# Patient Record
Sex: MALE | Race: WHITE | Employment: FULL TIME | URBAN - METROPOLITAN AREA
[De-identification: names, ages, dates, MRNs, and addresses within clinical notes are randomized per-mention and may not be internally consistent; named-entity substitution may affect disease eponyms.]

---

## 2017-02-07 ENCOUNTER — OFFICE VISIT (OUTPATIENT)
Dept: FAMILY MEDICINE CLINIC | Age: 57
End: 2017-02-07

## 2017-02-07 VITALS
WEIGHT: 188.4 LBS | HEART RATE: 85 BPM | RESPIRATION RATE: 16 BRPM | TEMPERATURE: 97.9 F | SYSTOLIC BLOOD PRESSURE: 149 MMHG | HEIGHT: 74 IN | BODY MASS INDEX: 24.18 KG/M2 | DIASTOLIC BLOOD PRESSURE: 95 MMHG | OXYGEN SATURATION: 98 %

## 2017-02-07 DIAGNOSIS — M54.42 ACUTE BILATERAL LOW BACK PAIN WITH BILATERAL SCIATICA: Primary | ICD-10-CM

## 2017-02-07 DIAGNOSIS — F10.10 ETOH ABUSE: ICD-10-CM

## 2017-02-07 DIAGNOSIS — R97.20 ELEVATED PSA: ICD-10-CM

## 2017-02-07 DIAGNOSIS — Z12.11 COLON CANCER SCREENING: ICD-10-CM

## 2017-02-07 DIAGNOSIS — E78.2 MIXED HYPERLIPIDEMIA: ICD-10-CM

## 2017-02-07 DIAGNOSIS — I10 ESSENTIAL HYPERTENSION: ICD-10-CM

## 2017-02-07 DIAGNOSIS — F41.9 ANXIETY: ICD-10-CM

## 2017-02-07 DIAGNOSIS — M54.41 ACUTE BILATERAL LOW BACK PAIN WITH BILATERAL SCIATICA: Primary | ICD-10-CM

## 2017-02-07 LAB
A/G RATIO: 1.4 (ref 1.1–2.2)
ALBUMIN SERPL-MCNC: 4.4 G/DL (ref 3.4–5)
ALP BLD-CCNC: 96 U/L (ref 40–129)
ALT SERPL-CCNC: 39 U/L (ref 10–40)
ANION GAP SERPL CALCULATED.3IONS-SCNC: 18 MMOL/L (ref 3–16)
AST SERPL-CCNC: 53 U/L (ref 15–37)
BILIRUB SERPL-MCNC: 0.6 MG/DL (ref 0–1)
BUN BLDV-MCNC: 7 MG/DL (ref 7–20)
CALCIUM SERPL-MCNC: 9.6 MG/DL (ref 8.3–10.6)
CHLORIDE BLD-SCNC: 94 MMOL/L (ref 99–110)
CHOLESTEROL, TOTAL: 242 MG/DL (ref 0–199)
CO2: 26 MMOL/L (ref 21–32)
CREAT SERPL-MCNC: 0.8 MG/DL (ref 0.9–1.3)
GFR AFRICAN AMERICAN: >60
GFR NON-AFRICAN AMERICAN: >60
GLOBULIN: 3.2 G/DL
GLUCOSE BLD-MCNC: 99 MG/DL (ref 70–99)
HDLC SERPL-MCNC: 102 MG/DL (ref 40–60)
LDL CHOLESTEROL CALCULATED: 125 MG/DL
POTASSIUM SERPL-SCNC: 4.8 MMOL/L (ref 3.5–5.1)
PROSTATE SPECIFIC ANTIGEN: 6.39 NG/ML (ref 0–4)
SODIUM BLD-SCNC: 138 MMOL/L (ref 136–145)
TOTAL PROTEIN: 7.6 G/DL (ref 6.4–8.2)
TRIGL SERPL-MCNC: 77 MG/DL (ref 0–150)
VLDLC SERPL CALC-MCNC: 15 MG/DL

## 2017-02-07 PROCEDURE — 99214 OFFICE O/P EST MOD 30 MIN: CPT | Performed by: FAMILY MEDICINE

## 2017-02-07 RX ORDER — METHYLPREDNISOLONE 4 MG/1
TABLET ORAL
Qty: 1 KIT | Refills: 0 | Status: SHIPPED | OUTPATIENT
Start: 2017-02-07

## 2017-02-07 RX ORDER — NAPROXEN 500 MG/1
500 TABLET ORAL 2 TIMES DAILY WITH MEALS
Qty: 30 TABLET | Refills: 0 | Status: SHIPPED | OUTPATIENT
Start: 2017-02-07

## 2017-02-07 RX ORDER — HYDROCHLOROTHIAZIDE 12.5 MG/1
12.5 TABLET ORAL DAILY
Qty: 30 TABLET | Refills: 0 | Status: SHIPPED | OUTPATIENT
Start: 2017-02-07

## 2017-02-07 ASSESSMENT — ENCOUNTER SYMPTOMS
APNEA: 0
CHEST TIGHTNESS: 0
RECTAL PAIN: 0
ABDOMINAL PAIN: 0
ABDOMINAL DISTENTION: 0
VOMITING: 0
DIARRHEA: 0
STRIDOR: 0
SHORTNESS OF BREATH: 0
CHOKING: 0
BACK PAIN: 1
NAUSEA: 0
WHEEZING: 0
ANAL BLEEDING: 0
COUGH: 0
BLOOD IN STOOL: 0
CONSTIPATION: 0

## 2017-02-08 ENCOUNTER — TELEPHONE (OUTPATIENT)
Dept: FAMILY MEDICINE CLINIC | Age: 57
End: 2017-02-08

## 2017-02-08 DIAGNOSIS — R97.20 ELEVATED PSA: Primary | ICD-10-CM

## 2020-02-18 ENCOUNTER — HOSPITAL ENCOUNTER (EMERGENCY)
Age: 60
Discharge: HOME | End: 2020-02-18
Attending: EMERGENCY MEDICINE
Payer: MEDICAID

## 2020-02-18 VITALS
DIASTOLIC BLOOD PRESSURE: 75 MMHG | HEART RATE: 80 BPM | SYSTOLIC BLOOD PRESSURE: 150 MMHG | HEIGHT: 75 IN | RESPIRATION RATE: 16 BRPM | BODY MASS INDEX: 21.14 KG/M2 | OXYGEN SATURATION: 100 % | TEMPERATURE: 98.6 F | WEIGHT: 170 LBS

## 2020-02-18 LAB
ANION GAP SERPL CALCULATED.3IONS-SCNC: 11 MMOL/L (ref 3–16)
BASOPHILS ABSOLUTE: 0 K/UL (ref 0–0.2)
BASOPHILS RELATIVE PERCENT: 0.6 %
BILIRUBIN URINE: NEGATIVE
BLOOD, URINE: NEGATIVE
BUN BLDV-MCNC: 11 MG/DL (ref 7–20)
CALCIUM SERPL-MCNC: 8.8 MG/DL (ref 8.3–10.6)
CHLORIDE BLD-SCNC: 100 MMOL/L (ref 99–110)
CLARITY: CLEAR
CO2: 21 MMOL/L (ref 21–32)
COLOR: YELLOW
CREAT SERPL-MCNC: 0.7 MG/DL (ref 0.9–1.3)
EOSINOPHILS ABSOLUTE: 0 K/UL (ref 0–0.6)
EOSINOPHILS RELATIVE PERCENT: 0.4 %
ETHANOL: NORMAL MG/DL (ref 0–0.08)
GFR AFRICAN AMERICAN: >60
GFR NON-AFRICAN AMERICAN: >60
GLUCOSE BLD-MCNC: 95 MG/DL (ref 70–99)
GLUCOSE URINE: NEGATIVE MG/DL
HCT VFR BLD CALC: 30.8 % (ref 40.5–52.5)
HEMOGLOBIN: 10.4 G/DL (ref 13.5–17.5)
KETONES, URINE: NEGATIVE MG/DL
LEUKOCYTE ESTERASE, URINE: NEGATIVE
LYMPHOCYTES ABSOLUTE: 1.5 K/UL (ref 1–5.1)
LYMPHOCYTES RELATIVE PERCENT: 25.2 %
MCH RBC QN AUTO: 34 PG (ref 26–34)
MCHC RBC AUTO-ENTMCNC: 33.7 G/DL (ref 31–36)
MCV RBC AUTO: 100.9 FL (ref 80–100)
MICROSCOPIC EXAMINATION: NORMAL
MONOCYTES ABSOLUTE: 0.5 K/UL (ref 0–1.3)
MONOCYTES RELATIVE PERCENT: 8.8 %
NEUTROPHILS ABSOLUTE: 3.9 K/UL (ref 1.7–7.7)
NEUTROPHILS RELATIVE PERCENT: 65 %
NITRITE, URINE: NEGATIVE
PDW BLD-RTO: 13.2 % (ref 12.4–15.4)
PH UA: 5.5 (ref 5–8)
PLATELET # BLD: 252 K/UL (ref 135–450)
PMV BLD AUTO: 6.5 FL (ref 5–10.5)
POTASSIUM REFLEX MAGNESIUM: 4 MMOL/L (ref 3.5–5.1)
PROTEIN UA: NEGATIVE MG/DL
RBC # BLD: 3.05 M/UL (ref 4.2–5.9)
SODIUM BLD-SCNC: 132 MMOL/L (ref 136–145)
SPECIFIC GRAVITY UA: 1.02 (ref 1–1.03)
URINE REFLEX TO CULTURE: NORMAL
URINE TYPE: NORMAL
UROBILINOGEN, URINE: 1 E.U./DL
WBC # BLD: 6 K/UL (ref 4–11)

## 2020-02-18 PROCEDURE — 81003 URINALYSIS AUTO W/O SCOPE: CPT

## 2020-02-18 PROCEDURE — 99284 EMERGENCY DEPT VISIT MOD MDM: CPT

## 2020-02-18 PROCEDURE — 85025 COMPLETE CBC W/AUTO DIFF WBC: CPT

## 2020-02-18 PROCEDURE — G0480 DRUG TEST DEF 1-7 CLASSES: HCPCS

## 2020-02-18 PROCEDURE — 80048 BASIC METABOLIC PNL TOTAL CA: CPT

## 2020-02-18 ASSESSMENT — ENCOUNTER SYMPTOMS
ABDOMINAL PAIN: 0
RHINORRHEA: 0
SHORTNESS OF BREATH: 0
NAUSEA: 0
DIARRHEA: 0
SORE THROAT: 0
BLOOD IN STOOL: 0
VOMITING: 0
CONSTIPATION: 0

## 2020-02-18 NOTE — CARE COORDINATION
SW completed PASRR form, faxed all discharge paperwork with facesheet and doctor's note to [facility]. Left a message for Gaudencio Hernandez to confirm it was received. MARLY met with family again informing that all forms and information has been faxed. MARLY stated just waiting for a call back from Gaudencio Hernandez for official confirmation pt has been accepted there. Family asked if they could leave now to take pt to the facility. MARLY stated yes they could, but it's not guaranteed at this point that pt has been accepted. MARLY stated that the Director at the facility has the final decision of this. Family verbalized understanding. Stated they would like to leave anyway as they have some things to  before they get there and have been waiting in ED a long time. MARLY confirmed on fax machine that fax successfully was sent. MARLY informed RN that family wants to discharge and take pt to [facility] now.       Electronically signed by MED Griffin LSW on 2/18/2020 at 4:00 PM

## 2020-02-18 NOTE — CARE COORDINATION
MARLY consulted to assist w/placement for pt to [facility]. Pt seen and diagnosed with dementia onset from long term alcohol use. SW contacted Jyoti Ordonez at the facility, who stated that their normal admissions person, Remington Diaz, is out today. Julián Monroy stated she was informed of this possible admission. Julián Monroy stated she needed PASRR completed and faxed along with doctor note and discharge paperwork for patient from the ED. Julián Monroy stated she is communicating with her Director and will get to MARLY with an answer if pt can come over today for admission to the facility.     Electronically signed by MED Adams, MARIA ANTONIA on 2/18/2020 at 3:07 PM

## 2020-02-18 NOTE — DISCHARGE INSTR - COC
Continuity of Care Form    Patient Name: Cruz Sanchez   :  1960  MRN:  [id number]    Admit date:  2020  Discharge date:  ***    Code Status Order: No Order   Advance Directives:     Admitting Physician:  No admitting provider for patient encounter. PCP: Dolores Rust MD    Discharging Nurse: [facility] Unit/Room#: ED-0005/05  Discharging Unit Phone Number: ***    Emergency Contact:   Extended Emergency Contact Information  Primary Emergency Contact: [address] [facility] Phone: [phone]  Relation: Brother/Sister  Secondary Emergency Contact: Anam Jackson, [address] Phone: [phone]  Relation: Child    Past Surgical History:  Past Surgical History:   Procedure Laterality Date    KNEE SURGERY Left 1976    Fracture:ORIF       Immunization History: There is no immunization history on file for this patient.     Active Problems:  Patient Active Problem List   Diagnosis Code    Neck pain on left side M54.2    Left-sided low back pain   M54.5    Anxiety F41.9    Elevated blood pressure reading without diagnosis of hypertension R03.0    ETOH abuse F10.10    Hyperlipidemia E78.5    Elevated PSA R97.20    Elevated liver function tests R94.5       Isolation/Infection:   Isolation          No Isolation        Patient Infection Status     None to display          Nurse Assessment:  Last Vital Signs: BP (!) 156/74   Pulse 84   Temp 98.6 °F (37 °C) (Infrared)   Resp 16   Ht 6' 3\" (1.905 m)   Wt 170 lb (77.1 kg)   SpO2 100%   BMI 21.25 kg/m²     Last documented pain score (0-10 scale):    Last Weight:   Wt Readings from Last 1 Encounters:   20 170 lb (77.1 kg)     Mental Status:  {IP PT MENTAL STATUS:}    IV Access:  { BUCK IV ACCESS:767750519}    Nursing Mobility/ADLs:  Walking   {CHP DME EIRD:836897024}  Transfer  {CHP DME HFKO:843466607}  Bathing  {CHP DME UOIC:607067860}  Dressing  {CHP DME NOCH:500838291}  Toileting  {CHP DME OJJ}  Feeding  {CHP DME LQKI:098249784}  Med Admin  {[facility] DME CRRN:523725610}  Med Delivery   { BUCK MED Delivery:967045303}    Wound Care Documentation and Therapy:        Elimination:  Continence:   · Bowel: {YES / RODRÍGUEZ:66234}  · Bladder: {YES / LX:75613}  Urinary Catheter: {Urinary Catheter:761817840}   Colostomy/Ileostomy/Ileal Conduit: {YES / VZ:40614}       Date of Last BM: ***  No intake or output data in the 24 hours ending 20 1454  No intake/output data recorded.     Safety Concerns:     [address] Safety Concerns:050885921}    Impairments/Disabilities:      [address] Impairments/Disabilities:430592890}    Nutrition Therapy:  Current Nutrition Therapy:   [address] Diet List:034473318}    Routes of Feeding: {CHP DME Other Feedings:341021770}  Liquids: {Slp liquid thickness:36337}  Daily Fluid Restriction: {CHP DME Yes amt example:930074359}  Last Modified Barium Swallow with Video (Video Swallowing Test): {Done Not Done [facility]:701067071}    Treatments at the Time of Hospital Discharge:   Respiratory Treatments: ***  Oxygen Therapy:  {Therapy; copd oxygen:55028}  Ventilator:    { CC Vent YZMI:170183671}    Rehab Therapies: {THERAPEUTIC INTERVENTION:0738577939}  Weight Bearing Status/Restrictions: [address] [facility] Weight Bearin}  Other Medical Equipment (for information only, NOT a DME order):  {EQUIPMENT:799648371}  Other Treatments: ***    Patient's personal belongings (please select all that are sent with patient):  {[facility] DME Belongings:129804510}    RN SIGNATURE:  {Esignature:841154343}    CASE MANAGEMENT/SOCIAL WORK SECTION    Inpatient Status Date: ***    Readmission Risk Assessment Score:  Readmission Risk              Risk of Unplanned Readmission:        0           Discharging to Facility/ Agency   · Name:   · Address:  · Phone:  · Fax:    Dialysis Facility (if applicable)   · Name:  · Address:  · Dialysis Schedule:  · Phone:  · Fax:    / signature: {Esignature:150749985}    PHYSICIAN SECTION    Prognosis: {Prognosis:3598668741}    Condition at Discharge: Jose E Abbasi Patient Condition:749849069}    Rehab Potential (if transferring to Rehab): {Prognosis:0769261151}    Recommended Labs or Other Treatments After Discharge: ***    Physician Certification: I certify the above information and transfer of Damian Cordon  is necessary for the continuing treatment of the diagnosis listed and that he requires {Admit to Appropriate Level of Care:21514} for {GREATER/LESS:061524606} 30 days.      Update Admission H&P: {CHP DME Changes in DXUZP:939270540}    PHYSICIAN SIGNATURE:  {Esignature:550455082}

## 2020-02-18 NOTE — ED PROVIDER NOTES
[facility]        Pt Name: Denis Pulido  MRN: [id number]  [place] 1960  Date of evaluation: 2020  Provider: GALO Lawler CNP  PCP: Stephanie Asher MD    This patient was seen and evaluated by the attending physician Janiya Davila MD.    CHIEF COMPLAINT       Chief Complaint   Patient presents with    Other     Pt has alcohol related dementia and recently treated and released from  where he went to a rehab facility. Pt left rehab facility because he didn't like it. family states it wasn't secure enough. Family has been attempting to find other placement but needs him medically cleared and a referral for placement. HISTORY OF PRESENT ILLNESS   (Location/Symptom, Timing/Onset,Context/Setting, Quality, Duration, Modifying Factors, Severity)  Note limiting factors. Denis Pulido is a 64 y.o. male who presents emergency department with concern for dementia. Patient was recently admitted to . Identified to have dementia secondary to chronic alcoholism. He was discharged to a long-term facility but signed himself out AMA. He was lost wandering around [facility] after this. Family reports that he is aware that he is a father, however he thinks his children are young when they are actually in their 35s. He is confused about the fact that he is grandchildren. He thinks his parents are still living even though they have been  for many years now. He also thinks he still with his wife even though they have been  for years as well. Family is trying to get power of . They have a facility that is willing to take him on a locked unit but need medical clearance. The patient is not suicidal or homicidal.  He is agreeable to go to the facility.   He denies fever, rash, headaches, dizziness, chest pain, shortness of breath, cough, congestion, abdominal pain, nausea, vomiting, High Cholesterol Neg Hx     Hypertension Neg Hx     Migraines Neg Hx     Rashes/Skin Problems Neg Hx     Seizures Neg Hx     Stroke Neg Hx     Thyroid Disease Neg Hx           SOCIAL HISTORY       Social History     Socioeconomic History    Marital status: Single     Spouse name: None    Number of children: None    Years of education: None    Highest education level: None   Occupational History    Occupation: Unemployed   Social Needs    Financial resource strain: None    Food insecurity:     Worry: None     Inability: None    Transportation needs:     Medical: None     Non-medical: None   Tobacco Use    Smoking status: Current Every Day Smoker     Packs/day: 1.00     Years: 20.00     Pack years: 20.00     Types: Cigarettes    Smokeless tobacco: Never Used   Substance and Sexual Activity    Alcohol use: Yes     Alcohol/week: 63.0 standard drinks     Types: 63 Cans of beer per week     Comment: 8-10 beers everyday (on 2/18/20 pt reports 2 a day, family unaware)    Drug use: No    Sexual activity: Yes   Lifestyle    Physical activity:     Days per week: None     Minutes per session: None    Stress: None   Relationships    Social connections:     Talks on phone: None     Gets together: None     Attends Nondenominational service: None     Active member of club or organization: None     Attends meetings of clubs or organizations: None     Relationship status: None    Intimate partner violence:     Fear of current or ex partner: None     Emotionally abused: None     Physically abused: None     Forced sexual activity: None   Other Topics Concern    None   Social History Narrative    None       SCREENINGS             PHYSICAL EXAM  (up to 7 for level 4, 8 or more for level 5)     ED Triage Vitals [02/18/20 1144]   BP Temp Temp Source Pulse Resp SpO2 Height Weight   121/80 98.6 °F (37 °C) Infrared 84 16 100 % 6' 3\" (1.905 m) 170 lb (77.1 kg)       Physical Exam  Vitals signs and nursing note reviewed. Constitutional:       Appearance: He is well-developed. He is not diaphoretic. HENT:      Head: Normocephalic and atraumatic. Eyes:      General: No scleral icterus. Right eye: No discharge. Left eye: No discharge. Neck:      Musculoskeletal: Normal range of motion and neck supple. Cardiovascular:      Rate and Rhythm: Normal rate and regular rhythm. Heart sounds: Normal heart sounds. No murmur. No friction rub. No gallop. Pulmonary:      Effort: Pulmonary effort is normal. No respiratory distress. Breath sounds: Normal breath sounds. No stridor. No wheezing or rales. Chest:      Chest wall: No tenderness. Abdominal:      General: Bowel sounds are normal. There is no distension. Palpations: Abdomen is soft. There is no mass. Tenderness: There is no abdominal tenderness. There is no guarding or rebound. Musculoskeletal: Normal range of motion. General: No tenderness. Skin:     General: Skin is warm and dry. Coloration: Skin is not pale. Neurological:      Mental Status: He is alert and oriented to person, place, and time. Coordination: Coordination normal.   Psychiatric:         Behavior: Behavior normal.         Thought Content: Thought content does not include homicidal or suicidal ideation.          DIAGNOSTIC RESULTS   LABS:    Labs Reviewed   CBC WITH AUTO DIFFERENTIAL - Abnormal; Notable for the following components:       Result Value    RBC 3.05 (*)     Hemoglobin 10.4 (*)     Hematocrit 30.8 (*)     .9 (*)     All other components within normal limits    Narrative:     Performed at:  [facility] [address], [place], [place] [facility] [address]   Phone [phone]   BASIC METABOLIC PANEL W/ REFLEX TO MG FOR LOW K - Abnormal; Notable for the following components:    Sodium 132 (*)     CREATININE 0.7 (*)     All other components within normal limits    Narrative:     Performed at:  [facility]

## 2020-06-21 ENCOUNTER — HOSPITAL ENCOUNTER (EMERGENCY)
Age: 60
Discharge: HOME | End: 2020-06-21
Attending: EMERGENCY MEDICINE
Payer: MEDICAID

## 2020-06-21 ENCOUNTER — APPOINTMENT (OUTPATIENT)
Dept: GENERAL RADIOLOGY | Age: 60
End: 2020-06-21
Payer: MEDICAID

## 2020-06-21 ENCOUNTER — APPOINTMENT (OUTPATIENT)
Dept: CT IMAGING | Age: 60
End: 2020-06-21
Payer: MEDICAID

## 2020-06-21 VITALS
OXYGEN SATURATION: 98 % | SYSTOLIC BLOOD PRESSURE: 113 MMHG | HEART RATE: 63 BPM | TEMPERATURE: 97 F | WEIGHT: 185 LBS | RESPIRATION RATE: 17 BRPM | BODY MASS INDEX: 23 KG/M2 | HEIGHT: 75 IN | DIASTOLIC BLOOD PRESSURE: 75 MMHG

## 2020-06-21 LAB
ACETAMINOPHEN LEVEL: <5 UG/ML (ref 10–30)
ALBUMIN SERPL-MCNC: 3.7 G/DL (ref 3.4–5)
ALP BLD-CCNC: 89 U/L (ref 40–129)
ALT SERPL-CCNC: <5 U/L (ref 10–40)
AMMONIA: 17 UMOL/L (ref 16–60)
AMPHETAMINE SCREEN, URINE: NORMAL
ANION GAP SERPL CALCULATED.3IONS-SCNC: 9 MMOL/L (ref 3–16)
AST SERPL-CCNC: 11 U/L (ref 15–37)
BARBITURATE SCREEN URINE: NORMAL
BASE EXCESS VENOUS: 1.6 MMOL/L (ref -3–3)
BASOPHILS ABSOLUTE: 0.2 K/UL (ref 0–0.2)
BASOPHILS RELATIVE PERCENT: 1.4 %
BENZODIAZEPINE SCREEN, URINE: NORMAL
BILIRUB SERPL-MCNC: 0.3 MG/DL (ref 0–1)
BILIRUBIN DIRECT: <0.2 MG/DL (ref 0–0.3)
BILIRUBIN URINE: NEGATIVE
BILIRUBIN, INDIRECT: ABNORMAL MG/DL (ref 0–1)
BLOOD, URINE: NEGATIVE
BUN BLDV-MCNC: 13 MG/DL (ref 7–20)
CALCIUM SERPL-MCNC: 9.5 MG/DL (ref 8.3–10.6)
CANNABINOID SCREEN URINE: NORMAL
CARBOXYHEMOGLOBIN: 9.8 % (ref 0–1.5)
CHLORIDE BLD-SCNC: 99 MMOL/L (ref 99–110)
CLARITY: CLEAR
CO2: 26 MMOL/L (ref 21–32)
COCAINE METABOLITE SCREEN URINE: NORMAL
COLOR: YELLOW
CREAT SERPL-MCNC: 0.7 MG/DL (ref 0.9–1.3)
EOSINOPHILS ABSOLUTE: 0 K/UL (ref 0–0.6)
EOSINOPHILS RELATIVE PERCENT: 0.3 %
GFR AFRICAN AMERICAN: >60
GFR NON-AFRICAN AMERICAN: >60
GLUCOSE BLD-MCNC: 112 MG/DL (ref 70–99)
GLUCOSE URINE: NEGATIVE MG/DL
HCO3 VENOUS: 28 MMOL/L (ref 23–29)
HCT VFR BLD CALC: 40.2 % (ref 40.5–52.5)
HEMOGLOBIN, VEN, REDUCED: 10 %
HEMOGLOBIN: 13.6 G/DL (ref 13.5–17.5)
INR BLD: 1.1 (ref 0.86–1.14)
KETONES, URINE: NEGATIVE MG/DL
LEUKOCYTE ESTERASE, URINE: NEGATIVE
LYMPHOCYTES ABSOLUTE: 1.2 K/UL (ref 1–5.1)
LYMPHOCYTES RELATIVE PERCENT: 7.9 %
Lab: NORMAL
MCH RBC QN AUTO: 31 PG (ref 26–34)
MCHC RBC AUTO-ENTMCNC: 33.8 G/DL (ref 31–36)
MCV RBC AUTO: 91.6 FL (ref 80–100)
METHADONE SCREEN, URINE: NORMAL
METHEMOGLOBIN VENOUS: 0 %
MICROSCOPIC EXAMINATION: NORMAL
MONOCYTES ABSOLUTE: 1.3 K/UL (ref 0–1.3)
MONOCYTES RELATIVE PERCENT: 8.2 %
NEUTROPHILS ABSOLUTE: 12.6 K/UL (ref 1.7–7.7)
NEUTROPHILS RELATIVE PERCENT: 82.2 %
NITRITE, URINE: NEGATIVE
O2 CONTENT, VEN: 16 VOL %
O2 SAT, VEN: 89 %
O2 THERAPY: ABNORMAL
OPIATE SCREEN URINE: NORMAL
OXYCODONE URINE: NORMAL
PCO2, VEN: 50 MMHG (ref 40–50)
PDW BLD-RTO: 12.8 % (ref 12.4–15.4)
PH UA: 6.5
PH UA: 6.5 (ref 5–8)
PH VENOUS: 7.36 (ref 7.35–7.45)
PHENCYCLIDINE SCREEN URINE: NORMAL
PLATELET # BLD: 208 K/UL (ref 135–450)
PMV BLD AUTO: 8 FL (ref 5–10.5)
PO2, VEN: 52.4 MMHG (ref 25–40)
POTASSIUM REFLEX MAGNESIUM: 3.9 MMOL/L (ref 3.5–5.1)
PROPOXYPHENE SCREEN: NORMAL
PROTEIN UA: NEGATIVE MG/DL
PROTHROMBIN TIME: 12.8 SEC (ref 10–13.2)
RBC # BLD: 4.39 M/UL (ref 4.2–5.9)
SALICYLATE, SERUM: <0.3 MG/DL (ref 15–30)
SODIUM BLD-SCNC: 134 MMOL/L (ref 136–145)
SPECIFIC GRAVITY UA: 1.01 (ref 1–1.03)
TCO2 CALC VENOUS: 66 MMOL/L
TOTAL PROTEIN: 7 G/DL (ref 6.4–8.2)
URINE TYPE: NORMAL
UROBILINOGEN, URINE: 0.2 E.U./DL
WBC # BLD: 15.3 K/UL (ref 4–11)

## 2020-06-21 PROCEDURE — 73090 X-RAY EXAM OF FOREARM: CPT

## 2020-06-21 PROCEDURE — 85610 PROTHROMBIN TIME: CPT

## 2020-06-21 PROCEDURE — 6360000002 HC RX W HCPCS: Performed by: PHYSICIAN ASSISTANT

## 2020-06-21 PROCEDURE — U0002 COVID-19 LAB TEST NON-CDC: HCPCS

## 2020-06-21 PROCEDURE — 85025 COMPLETE CBC W/AUTO DIFF WBC: CPT

## 2020-06-21 PROCEDURE — 71045 X-RAY EXAM CHEST 1 VIEW: CPT

## 2020-06-21 PROCEDURE — U0003 INFECTIOUS AGENT DETECTION BY NUCLEIC ACID (DNA OR RNA); SEVERE ACUTE RESPIRATORY SYNDROME CORONAVIRUS 2 (SARS-COV-2) (CORONAVIRUS DISEASE [COVID-19]), AMPLIFIED PROBE TECHNIQUE, MAKING USE OF HIGH THROUGHPUT TECHNOLOGIES AS DESCRIBED BY CMS-2020-01-R: HCPCS

## 2020-06-21 PROCEDURE — 90715 TDAP VACCINE 7 YRS/> IM: CPT | Performed by: PHYSICIAN ASSISTANT

## 2020-06-21 PROCEDURE — 99284 EMERGENCY DEPT VISIT MOD MDM: CPT

## 2020-06-21 PROCEDURE — G0480 DRUG TEST DEF 1-7 CLASSES: HCPCS

## 2020-06-21 PROCEDURE — 80307 DRUG TEST PRSMV CHEM ANLYZR: CPT

## 2020-06-21 PROCEDURE — 82803 BLOOD GASES ANY COMBINATION: CPT

## 2020-06-21 PROCEDURE — 6370000000 HC RX 637 (ALT 250 FOR IP): Performed by: PHYSICIAN ASSISTANT

## 2020-06-21 PROCEDURE — 82140 ASSAY OF AMMONIA: CPT

## 2020-06-21 PROCEDURE — 72125 CT NECK SPINE W/O DYE: CPT

## 2020-06-21 PROCEDURE — 80048 BASIC METABOLIC PNL TOTAL CA: CPT

## 2020-06-21 PROCEDURE — 93005 ELECTROCARDIOGRAM TRACING: CPT | Performed by: EMERGENCY MEDICINE

## 2020-06-21 PROCEDURE — 81003 URINALYSIS AUTO W/O SCOPE: CPT

## 2020-06-21 PROCEDURE — 90471 IMMUNIZATION ADMIN: CPT | Performed by: PHYSICIAN ASSISTANT

## 2020-06-21 PROCEDURE — 80076 HEPATIC FUNCTION PANEL: CPT

## 2020-06-21 PROCEDURE — 70450 CT HEAD/BRAIN W/O DYE: CPT

## 2020-06-21 RX ORDER — ACETAMINOPHEN 325 MG/1
650 TABLET ORAL ONCE
Status: COMPLETED | OUTPATIENT
Start: 2020-06-21 | End: 2020-06-21

## 2020-06-21 RX ADMIN — ACETAMINOPHEN 650 MG: 325 TABLET, FILM COATED ORAL at 14:06

## 2020-06-21 RX ADMIN — TETANUS TOXOID, REDUCED DIPHTHERIA TOXOID AND ACELLULAR PERTUSSIS VACCINE, ADSORBED 0.5 ML: 5; 2.5; 8; 8; 2.5 SUSPENSION INTRAMUSCULAR at 12:28

## 2020-06-21 ASSESSMENT — ENCOUNTER SYMPTOMS
ABDOMINAL PAIN: 0
COLOR CHANGE: 1
NAUSEA: 0
CHEST TIGHTNESS: 0
SHORTNESS OF BREATH: 0
DIARRHEA: 0
VOMITING: 0

## 2020-06-21 ASSESSMENT — PAIN DESCRIPTION - PAIN TYPE: TYPE: ACUTE PAIN

## 2020-06-21 ASSESSMENT — PAIN DESCRIPTION - LOCATION: LOCATION: ARM;HEAD

## 2020-06-21 ASSESSMENT — PAIN SCALES - GENERAL
PAINLEVEL_OUTOF10: 3
PAINLEVEL_OUTOF10: 5
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
## 2020-06-22 LAB
EKG ATRIAL RATE: 71 BPM
EKG DIAGNOSIS: NORMAL
EKG P AXIS: 57 DEGREES
EKG P-R INTERVAL: 104 MS
EKG Q-T INTERVAL: 386 MS
EKG QRS DURATION: 86 MS
EKG QTC CALCULATION (BAZETT): 419 MS
EKG R AXIS: 3 DEGREES
EKG T AXIS: 49 DEGREES
EKG VENTRICULAR RATE: 71 BPM
SARS-COV-2, PCR: NOT DETECTED

## 2020-06-22 PROCEDURE — 93010 ELECTROCARDIOGRAM REPORT: CPT | Performed by: INTERNAL MEDICINE

## 2025-03-18 ENCOUNTER — HOSPITAL ENCOUNTER (INPATIENT)
Age: 65
LOS: 3 days | Discharge: NURSING, REHAB OR LONG-TERM CARE | DRG: 422 | End: 2025-03-21
Attending: INTERNAL MEDICINE | Admitting: INTERNAL MEDICINE
Payer: MEDICAID

## 2025-03-18 ENCOUNTER — APPOINTMENT (OUTPATIENT)
Dept: CT IMAGING | Age: 65
DRG: 422 | End: 2025-03-18
Payer: MEDICAID

## 2025-03-18 ENCOUNTER — APPOINTMENT (OUTPATIENT)
Dept: GENERAL RADIOLOGY | Age: 65
DRG: 422 | End: 2025-03-18
Payer: MEDICAID

## 2025-03-18 DIAGNOSIS — K04.7 DENTAL INFECTION: ICD-10-CM

## 2025-03-18 DIAGNOSIS — R55 SYNCOPE AND COLLAPSE: Primary | ICD-10-CM

## 2025-03-18 DIAGNOSIS — R55 SYNCOPE, UNSPECIFIED SYNCOPE TYPE: ICD-10-CM

## 2025-03-18 DIAGNOSIS — K12.1 ORAL ULCER: ICD-10-CM

## 2025-03-18 LAB
ALBUMIN SERPL-MCNC: 3.4 G/DL (ref 3.4–5)
ALBUMIN/GLOB SERPL: 1.1 {RATIO} (ref 1.1–2.2)
ALP SERPL-CCNC: 53 U/L (ref 40–129)
ALT SERPL-CCNC: 12 U/L (ref 10–40)
ANION GAP SERPL CALCULATED.3IONS-SCNC: 12 MMOL/L (ref 3–16)
AST SERPL-CCNC: 23 U/L (ref 15–37)
BASOPHILS # BLD: 0 K/UL (ref 0–0.2)
BASOPHILS NFR BLD: 0.2 %
BILIRUB SERPL-MCNC: 0.4 MG/DL (ref 0–1)
BUN SERPL-MCNC: 8 MG/DL (ref 7–20)
CALCIUM SERPL-MCNC: 9 MG/DL (ref 8.3–10.6)
CHLORIDE SERPL-SCNC: 101 MMOL/L (ref 99–110)
CO2 SERPL-SCNC: 24 MMOL/L (ref 21–32)
CREAT SERPL-MCNC: 1.1 MG/DL (ref 0.8–1.3)
DEPRECATED RDW RBC AUTO: 13.5 % (ref 12.4–15.4)
EOSINOPHIL # BLD: 0 K/UL (ref 0–0.6)
EOSINOPHIL NFR BLD: 0.3 %
GFR SERPLBLD CREATININE-BSD FMLA CKD-EPI: 75 ML/MIN/{1.73_M2}
GLUCOSE SERPL-MCNC: 124 MG/DL (ref 70–99)
HCT VFR BLD AUTO: 43.8 % (ref 40.5–52.5)
HGB BLD-MCNC: 14.7 G/DL (ref 13.5–17.5)
LYMPHOCYTES # BLD: 1.8 K/UL (ref 1–5.1)
LYMPHOCYTES NFR BLD: 15.8 %
MCH RBC QN AUTO: 32.7 PG (ref 26–34)
MCHC RBC AUTO-ENTMCNC: 33.5 G/DL (ref 31–36)
MCV RBC AUTO: 97.5 FL (ref 80–100)
MONOCYTES # BLD: 1.1 K/UL (ref 0–1.3)
MONOCYTES NFR BLD: 9.6 %
NEUTROPHILS # BLD: 8.3 K/UL (ref 1.7–7.7)
NEUTROPHILS NFR BLD: 74.1 %
PLATELET # BLD AUTO: 183 K/UL (ref 135–450)
PMV BLD AUTO: 7.4 FL (ref 5–10.5)
POTASSIUM SERPL-SCNC: 4.2 MMOL/L (ref 3.5–5.1)
PROT SERPL-MCNC: 6.5 G/DL (ref 6.4–8.2)
RBC # BLD AUTO: 4.49 M/UL (ref 4.2–5.9)
SODIUM SERPL-SCNC: 137 MMOL/L (ref 136–145)
TROPONIN, HIGH SENSITIVITY: 21 NG/L (ref 0–22)
WBC # BLD AUTO: 11.2 K/UL (ref 4–11)

## 2025-03-18 PROCEDURE — 85025 COMPLETE CBC W/AUTO DIFF WBC: CPT

## 2025-03-18 PROCEDURE — 1200000000 HC SEMI PRIVATE

## 2025-03-18 PROCEDURE — 96365 THER/PROPH/DIAG IV INF INIT: CPT

## 2025-03-18 PROCEDURE — 70491 CT SOFT TISSUE NECK W/DYE: CPT

## 2025-03-18 PROCEDURE — 36415 COLL VENOUS BLD VENIPUNCTURE: CPT

## 2025-03-18 PROCEDURE — 6370000000 HC RX 637 (ALT 250 FOR IP): Performed by: NURSE PRACTITIONER

## 2025-03-18 PROCEDURE — 71045 X-RAY EXAM CHEST 1 VIEW: CPT

## 2025-03-18 PROCEDURE — 93005 ELECTROCARDIOGRAM TRACING: CPT | Performed by: EMERGENCY MEDICINE

## 2025-03-18 PROCEDURE — 6360000004 HC RX CONTRAST MEDICATION: Performed by: NURSE PRACTITIONER

## 2025-03-18 PROCEDURE — 80053 COMPREHEN METABOLIC PANEL: CPT

## 2025-03-18 PROCEDURE — 84484 ASSAY OF TROPONIN QUANT: CPT

## 2025-03-18 PROCEDURE — 6360000002 HC RX W HCPCS: Performed by: NURSE PRACTITIONER

## 2025-03-18 PROCEDURE — 70450 CT HEAD/BRAIN W/O DYE: CPT

## 2025-03-18 PROCEDURE — 99285 EMERGENCY DEPT VISIT HI MDM: CPT

## 2025-03-18 RX ORDER — AMOXICILLIN 250 MG
1 CAPSULE ORAL DAILY
COMMUNITY

## 2025-03-18 RX ORDER — M-VIT,TX,IRON,MINS/CALC/FOLIC 27MG-0.4MG
1 TABLET ORAL DAILY
Status: DISCONTINUED | OUTPATIENT
Start: 2025-03-19 | End: 2025-03-21 | Stop reason: HOSPADM

## 2025-03-18 RX ORDER — ONDANSETRON 4 MG/1
4 TABLET, ORALLY DISINTEGRATING ORAL EVERY 8 HOURS PRN
Status: DISCONTINUED | OUTPATIENT
Start: 2025-03-18 | End: 2025-03-21 | Stop reason: HOSPADM

## 2025-03-18 RX ORDER — CLOMIPRAMINE HYDROCHLORIDE 25 MG/1
25 CAPSULE ORAL NIGHTLY
COMMUNITY

## 2025-03-18 RX ORDER — CLINDAMYCIN PHOSPHATE 600 MG/50ML
600 INJECTION, SOLUTION INTRAVENOUS ONCE
Status: COMPLETED | OUTPATIENT
Start: 2025-03-18 | End: 2025-03-18

## 2025-03-18 RX ORDER — SODIUM CHLORIDE 0.9 % (FLUSH) 0.9 %
5-40 SYRINGE (ML) INJECTION PRN
Status: DISCONTINUED | OUTPATIENT
Start: 2025-03-18 | End: 2025-03-21 | Stop reason: HOSPADM

## 2025-03-18 RX ORDER — DIVALPROEX SODIUM 250 MG/1
1000 TABLET, DELAYED RELEASE ORAL 2 TIMES DAILY
Status: DISCONTINUED | OUTPATIENT
Start: 2025-03-18 | End: 2025-03-21 | Stop reason: HOSPADM

## 2025-03-18 RX ORDER — CLOMIPRAMINE HYDROCHLORIDE 25 MG/1
25 CAPSULE ORAL NIGHTLY
Status: DISCONTINUED | OUTPATIENT
Start: 2025-03-18 | End: 2025-03-21 | Stop reason: HOSPADM

## 2025-03-18 RX ORDER — SENNA AND DOCUSATE SODIUM 50; 8.6 MG/1; MG/1
1 TABLET, FILM COATED ORAL DAILY
Status: DISCONTINUED | OUTPATIENT
Start: 2025-03-19 | End: 2025-03-21 | Stop reason: HOSPADM

## 2025-03-18 RX ORDER — ONDANSETRON 2 MG/ML
4 INJECTION INTRAMUSCULAR; INTRAVENOUS EVERY 6 HOURS PRN
Status: DISCONTINUED | OUTPATIENT
Start: 2025-03-18 | End: 2025-03-21 | Stop reason: HOSPADM

## 2025-03-18 RX ORDER — ACETAMINOPHEN 500 MG
1000 TABLET ORAL EVERY 8 HOURS PRN
COMMUNITY

## 2025-03-18 RX ORDER — POLYETHYLENE GLYCOL 3350 17 G
2 POWDER IN PACKET (EA) ORAL
Status: DISCONTINUED | OUTPATIENT
Start: 2025-03-18 | End: 2025-03-19

## 2025-03-18 RX ORDER — ACETAMINOPHEN 650 MG/1
650 SUPPOSITORY RECTAL EVERY 6 HOURS PRN
Status: DISCONTINUED | OUTPATIENT
Start: 2025-03-18 | End: 2025-03-21 | Stop reason: HOSPADM

## 2025-03-18 RX ORDER — TRAMADOL HYDROCHLORIDE 50 MG/1
50 TABLET ORAL ONCE
Status: COMPLETED | OUTPATIENT
Start: 2025-03-18 | End: 2025-03-18

## 2025-03-18 RX ORDER — IOPAMIDOL 755 MG/ML
75 INJECTION, SOLUTION INTRAVASCULAR
Status: COMPLETED | OUTPATIENT
Start: 2025-03-18 | End: 2025-03-18

## 2025-03-18 RX ORDER — DIVALPROEX SODIUM 500 MG/1
1000 TABLET, DELAYED RELEASE ORAL 2 TIMES DAILY
COMMUNITY

## 2025-03-18 RX ORDER — CETIRIZINE HYDROCHLORIDE 10 MG/1
10 TABLET ORAL DAILY
Status: DISCONTINUED | OUTPATIENT
Start: 2025-03-19 | End: 2025-03-21 | Stop reason: HOSPADM

## 2025-03-18 RX ORDER — POLYETHYLENE GLYCOL 3350 17 G/17G
17 POWDER, FOR SOLUTION ORAL DAILY PRN
Status: DISCONTINUED | OUTPATIENT
Start: 2025-03-18 | End: 2025-03-21 | Stop reason: HOSPADM

## 2025-03-18 RX ORDER — ENOXAPARIN SODIUM 100 MG/ML
40 INJECTION SUBCUTANEOUS DAILY
Status: DISCONTINUED | OUTPATIENT
Start: 2025-03-19 | End: 2025-03-21 | Stop reason: HOSPADM

## 2025-03-18 RX ORDER — SODIUM CHLORIDE 9 MG/ML
INJECTION, SOLUTION INTRAVENOUS PRN
Status: DISCONTINUED | OUTPATIENT
Start: 2025-03-18 | End: 2025-03-21 | Stop reason: HOSPADM

## 2025-03-18 RX ORDER — M-VIT,TX,IRON,MINS/CALC/FOLIC 27MG-0.4MG
1 TABLET ORAL DAILY
COMMUNITY

## 2025-03-18 RX ORDER — CETIRIZINE HYDROCHLORIDE 10 MG/1
10 TABLET ORAL DAILY
COMMUNITY

## 2025-03-18 RX ORDER — POTASSIUM CHLORIDE 1500 MG/1
40 TABLET, EXTENDED RELEASE ORAL PRN
Status: ACTIVE | OUTPATIENT
Start: 2025-03-18 | End: 2025-03-20

## 2025-03-18 RX ORDER — CLINDAMYCIN IN PERCENT DEXTROSE 6 MG/ML
300 INJECTION, SOLUTION INTRAVENOUS EVERY 8 HOURS
Status: DISCONTINUED | OUTPATIENT
Start: 2025-03-19 | End: 2025-03-21 | Stop reason: HOSPADM

## 2025-03-18 RX ORDER — POTASSIUM CHLORIDE 7.45 MG/ML
10 INJECTION INTRAVENOUS PRN
Status: ACTIVE | OUTPATIENT
Start: 2025-03-18 | End: 2025-03-20

## 2025-03-18 RX ORDER — ACETAMINOPHEN 325 MG/1
650 TABLET ORAL EVERY 6 HOURS PRN
Status: DISCONTINUED | OUTPATIENT
Start: 2025-03-18 | End: 2025-03-21 | Stop reason: HOSPADM

## 2025-03-18 RX ORDER — MAGNESIUM SULFATE IN WATER 40 MG/ML
2000 INJECTION, SOLUTION INTRAVENOUS PRN
Status: DISCONTINUED | OUTPATIENT
Start: 2025-03-18 | End: 2025-03-21 | Stop reason: HOSPADM

## 2025-03-18 RX ORDER — SODIUM CHLORIDE 0.9 % (FLUSH) 0.9 %
5-40 SYRINGE (ML) INJECTION EVERY 12 HOURS SCHEDULED
Status: DISCONTINUED | OUTPATIENT
Start: 2025-03-18 | End: 2025-03-21 | Stop reason: HOSPADM

## 2025-03-18 RX ADMIN — NICOTINE POLACRILEX 2 MG: 2 LOZENGE ORAL at 20:52

## 2025-03-18 RX ADMIN — IOPAMIDOL 75 ML: 755 INJECTION, SOLUTION INTRAVENOUS at 20:45

## 2025-03-18 RX ADMIN — TRAMADOL HYDROCHLORIDE 50 MG: 50 TABLET, COATED ORAL at 20:53

## 2025-03-18 RX ADMIN — CLINDAMYCIN PHOSPHATE 600 MG: 600 INJECTION, SOLUTION INTRAVENOUS at 21:28

## 2025-03-18 ASSESSMENT — ENCOUNTER SYMPTOMS
GASTROINTESTINAL NEGATIVE: 1
RESPIRATORY NEGATIVE: 1

## 2025-03-18 ASSESSMENT — LIFESTYLE VARIABLES
HOW OFTEN DO YOU HAVE A DRINK CONTAINING ALCOHOL: NEVER
HOW MANY STANDARD DRINKS CONTAINING ALCOHOL DO YOU HAVE ON A TYPICAL DAY: PATIENT DOES NOT DRINK

## 2025-03-18 ASSESSMENT — PAIN - FUNCTIONAL ASSESSMENT: PAIN_FUNCTIONAL_ASSESSMENT: NONE - DENIES PAIN

## 2025-03-18 NOTE — ED TRIAGE NOTES
Pt into ER [facility] with c/c Syncope after walking out of an elevator, loss of bladder, no reports of injury, gradually has been getting weaker over the last several days.  Per EMS initial BP 95/60, .
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
## 2025-03-19 ENCOUNTER — APPOINTMENT (OUTPATIENT)
Age: 65
DRG: 422 | End: 2025-03-19
Attending: STUDENT IN AN ORGANIZED HEALTH CARE EDUCATION/TRAINING PROGRAM
Payer: MEDICAID

## 2025-03-19 LAB
ANION GAP SERPL CALCULATED.3IONS-SCNC: 10 MMOL/L (ref 3–16)
BASOPHILS # BLD: 0 K/UL (ref 0–0.2)
BASOPHILS NFR BLD: 0.3 %
BILIRUB UR QL STRIP.AUTO: ABNORMAL
BUN SERPL-MCNC: 9 MG/DL (ref 7–20)
CALCIUM SERPL-MCNC: 9 MG/DL (ref 8.3–10.6)
CHLORIDE SERPL-SCNC: 102 MMOL/L (ref 99–110)
CLARITY UR: CLEAR
CO2 SERPL-SCNC: 25 MMOL/L (ref 21–32)
COLOR UR: ABNORMAL
CREAT SERPL-MCNC: 0.7 MG/DL (ref 0.8–1.3)
DEPRECATED RDW RBC AUTO: 13.2 % (ref 12.4–15.4)
ECHO AO ASC DIAM: 3.6 CM
ECHO AO ASCENDING AORTA INDEX: 1.65 CM/M2
ECHO AO ROOT DIAM: 3.9 CM
ECHO AO ROOT INDEX: 1.79 CM/M2
ECHO AV AREA PEAK VELOCITY: 2.8 CM2
ECHO AV AREA VTI: 2.6 CM2
ECHO AV AREA/BSA PEAK VELOCITY: 1.3 CM2/M2
ECHO AV AREA/BSA VTI: 1.2 CM2/M2
ECHO AV MEAN GRADIENT: 3 MMHG
ECHO AV MEAN VELOCITY: 0.7 M/S
ECHO AV PEAK GRADIENT: 5 MMHG
ECHO AV PEAK VELOCITY: 1.1 M/S
ECHO AV VELOCITY RATIO: 0.91
ECHO AV VTI: 22.4 CM
ECHO BSA: 2.18 M2
ECHO EST RA PRESSURE: 3 MMHG
ECHO IVC PROX: 1.6 CM
ECHO LA AREA 2C: 13.6 CM2
ECHO LA AREA 4C: 19.7 CM2
ECHO LA MAJOR AXIS: 7 CM
ECHO LA MINOR AXIS: 4.2 CM
ECHO LA VOL MOD A2C: 36 ML (ref 18–58)
ECHO LA VOL MOD A4C: 40 ML (ref 18–58)
ECHO LA VOLUME INDEX MOD A2C: 17 ML/M2 (ref 16–34)
ECHO LA VOLUME INDEX MOD A4C: 18 ML/M2 (ref 16–34)
ECHO LV E' LATERAL VELOCITY: 8.81 CM/S
ECHO LV E' SEPTAL VELOCITY: 6.53 CM/S
ECHO LV EDV A2C: 83 ML
ECHO LV EDV A4C: 92 ML
ECHO LV EDV INDEX A4C: 42 ML/M2
ECHO LV EDV NDEX A2C: 38 ML/M2
ECHO LV EF PHYSICIAN: 65 %
ECHO LV EJECTION FRACTION A2C: 68 %
ECHO LV EJECTION FRACTION A4C: 63 %
ECHO LV EJECTION FRACTION BIPLANE: 66 % (ref 55–100)
ECHO LV ESV A2C: 27 ML
ECHO LV ESV A4C: 34 ML
ECHO LV ESV INDEX A2C: 12 ML/M2
ECHO LV ESV INDEX A4C: 16 ML/M2
ECHO LV FRACTIONAL SHORTENING: 39 % (ref 28–44)
ECHO LV INTERNAL DIMENSION DIASTOLE INDEX: 2.02 CM/M2
ECHO LV INTERNAL DIMENSION DIASTOLIC: 4.4 CM (ref 4.2–5.9)
ECHO LV INTERNAL DIMENSION SYSTOLIC INDEX: 1.24 CM/M2
ECHO LV INTERNAL DIMENSION SYSTOLIC: 2.7 CM
ECHO LV IVSD: 0.8 CM (ref 0.6–1)
ECHO LV MASS 2D: 109.4 G (ref 88–224)
ECHO LV MASS INDEX 2D: 50.2 G/M2 (ref 49–115)
ECHO LV POSTERIOR WALL DIASTOLIC: 0.8 CM (ref 0.6–1)
ECHO LV RELATIVE WALL THICKNESS RATIO: 0.36
ECHO LVOT AREA: 3.1 CM2
ECHO LVOT AV VTI INDEX: 0.84
ECHO LVOT DIAM: 2 CM
ECHO LVOT MEAN GRADIENT: 2 MMHG
ECHO LVOT PEAK GRADIENT: 4 MMHG
ECHO LVOT PEAK VELOCITY: 1 M/S
ECHO LVOT STROKE VOLUME INDEX: 27.1 ML/M2
ECHO LVOT SV: 59 ML
ECHO LVOT VTI: 18.8 CM
ECHO MV A VELOCITY: 0.68 M/S
ECHO MV AREA VTI: 2.4 CM2
ECHO MV E DECELERATION TIME (DT): 194 MS
ECHO MV E VELOCITY: 0.45 M/S
ECHO MV E/A RATIO: 0.66
ECHO MV E/E' LATERAL: 5.11
ECHO MV E/E' RATIO (AVERAGED): 6
ECHO MV E/E' SEPTAL: 6.89
ECHO MV LVOT VTI INDEX: 1.31
ECHO MV MAX VELOCITY: 0.8 M/S
ECHO MV MEAN GRADIENT: 1 MMHG
ECHO MV MEAN VELOCITY: 0.4 M/S
ECHO MV PEAK GRADIENT: 2 MMHG
ECHO MV VTI: 24.7 CM
ECHO PV MAX VELOCITY: 0.7 M/S
ECHO PV PEAK GRADIENT: 2 MMHG
ECHO RA AREA 4C: 11.5 CM2
ECHO RA END SYSTOLIC VOLUME APICAL 4 CHAMBER INDEX BSA: 12 ML/M2
ECHO RA VOLUME: 27 ML
ECHO RIGHT VENTRICULAR SYSTOLIC PRESSURE (RVSP): 6 MMHG
ECHO RV BASAL DIMENSION: 3.6 CM
ECHO RV FREE WALL PEAK S': 15.1 CM/S
ECHO RV MID DIMENSION: 2.2 CM
ECHO RV TAPSE: 1.7 CM (ref 1.7–?)
ECHO TV REGURGITANT MAX VELOCITY: 0.83 M/S
ECHO TV REGURGITANT PEAK GRADIENT: 3 MMHG
EKG ATRIAL RATE: 75 BPM
EKG DIAGNOSIS: NORMAL
EKG P AXIS: 64 DEGREES
EKG P-R INTERVAL: 140 MS
EKG Q-T INTERVAL: 370 MS
EKG QRS DURATION: 86 MS
EKG QTC CALCULATION (BAZETT): 413 MS
EKG R AXIS: 1 DEGREES
EKG T AXIS: 67 DEGREES
EKG VENTRICULAR RATE: 75 BPM
EOSINOPHIL # BLD: 0.1 K/UL (ref 0–0.6)
EOSINOPHIL NFR BLD: 0.7 %
GFR SERPLBLD CREATININE-BSD FMLA CKD-EPI: >90 ML/MIN/{1.73_M2}
GLUCOSE SERPL-MCNC: 98 MG/DL (ref 70–99)
GLUCOSE UR STRIP.AUTO-MCNC: NEGATIVE MG/DL
HCT VFR BLD AUTO: 41.4 % (ref 40.5–52.5)
HGB BLD-MCNC: 14.1 G/DL (ref 13.5–17.5)
HGB UR QL STRIP.AUTO: NEGATIVE
KETONES UR STRIP.AUTO-MCNC: ABNORMAL MG/DL
LEUKOCYTE ESTERASE UR QL STRIP.AUTO: NEGATIVE
LYMPHOCYTES # BLD: 2.7 K/UL (ref 1–5.1)
LYMPHOCYTES NFR BLD: 26.1 %
MCH RBC QN AUTO: 32.9 PG (ref 26–34)
MCHC RBC AUTO-ENTMCNC: 34 G/DL (ref 31–36)
MCV RBC AUTO: 96.6 FL (ref 80–100)
MONOCYTES # BLD: 1.5 K/UL (ref 0–1.3)
MONOCYTES NFR BLD: 14.4 %
NEUTROPHILS # BLD: 6 K/UL (ref 1.7–7.7)
NEUTROPHILS NFR BLD: 58.5 %
NITRITE UR QL STRIP.AUTO: NEGATIVE
PH UR STRIP.AUTO: 6 [PH] (ref 5–8)
PLATELET # BLD AUTO: 169 K/UL (ref 135–450)
PMV BLD AUTO: 7.8 FL (ref 5–10.5)
POTASSIUM SERPL-SCNC: 3.8 MMOL/L (ref 3.5–5.1)
PROT UR STRIP.AUTO-MCNC: NEGATIVE MG/DL
RBC # BLD AUTO: 4.28 M/UL (ref 4.2–5.9)
SODIUM SERPL-SCNC: 137 MMOL/L (ref 136–145)
SP GR UR STRIP.AUTO: 1.03 (ref 1–1.03)
TROPONIN, HIGH SENSITIVITY: 18 NG/L (ref 0–22)
UA COMPLETE W REFLEX CULTURE PNL UR: ABNORMAL
UA DIPSTICK W REFLEX MICRO PNL UR: ABNORMAL
URN SPEC COLLECT METH UR: ABNORMAL
UROBILINOGEN UR STRIP-ACNC: 4 E.U./DL
WBC # BLD AUTO: 10.2 K/UL (ref 4–11)

## 2025-03-19 PROCEDURE — C8929 TTE W OR WO FOL WCON,DOPPLER: HCPCS

## 2025-03-19 PROCEDURE — 2500000003 HC RX 250 WO HCPCS: Performed by: INTERNAL MEDICINE

## 2025-03-19 PROCEDURE — 1200000000 HC SEMI PRIVATE

## 2025-03-19 PROCEDURE — 93010 ELECTROCARDIOGRAM REPORT: CPT | Performed by: INTERNAL MEDICINE

## 2025-03-19 PROCEDURE — 81003 URINALYSIS AUTO W/O SCOPE: CPT

## 2025-03-19 PROCEDURE — 6360000002 HC RX W HCPCS: Performed by: INTERNAL MEDICINE

## 2025-03-19 PROCEDURE — 97116 GAIT TRAINING THERAPY: CPT

## 2025-03-19 PROCEDURE — 97530 THERAPEUTIC ACTIVITIES: CPT

## 2025-03-19 PROCEDURE — 80048 BASIC METABOLIC PNL TOTAL CA: CPT

## 2025-03-19 PROCEDURE — 87040 BLOOD CULTURE FOR BACTERIA: CPT

## 2025-03-19 PROCEDURE — 42800 BIOPSY OF THROAT: CPT | Performed by: STUDENT IN AN ORGANIZED HEALTH CARE EDUCATION/TRAINING PROGRAM

## 2025-03-19 PROCEDURE — 6370000000 HC RX 637 (ALT 250 FOR IP): Performed by: INTERNAL MEDICINE

## 2025-03-19 PROCEDURE — 97166 OT EVAL MOD COMPLEX 45 MIN: CPT

## 2025-03-19 PROCEDURE — 99223 1ST HOSP IP/OBS HIGH 75: CPT | Performed by: STUDENT IN AN ORGANIZED HEALTH CARE EDUCATION/TRAINING PROGRAM

## 2025-03-19 PROCEDURE — 6360000004 HC RX CONTRAST MEDICATION: Performed by: STUDENT IN AN ORGANIZED HEALTH CARE EDUCATION/TRAINING PROGRAM

## 2025-03-19 PROCEDURE — 2580000003 HC RX 258: Performed by: STUDENT IN AN ORGANIZED HEALTH CARE EDUCATION/TRAINING PROGRAM

## 2025-03-19 PROCEDURE — 93306 TTE W/DOPPLER COMPLETE: CPT | Performed by: INTERNAL MEDICINE

## 2025-03-19 PROCEDURE — 97161 PT EVAL LOW COMPLEX 20 MIN: CPT

## 2025-03-19 PROCEDURE — 36415 COLL VENOUS BLD VENIPUNCTURE: CPT

## 2025-03-19 PROCEDURE — 94760 N-INVAS EAR/PLS OXIMETRY 1: CPT

## 2025-03-19 PROCEDURE — 85025 COMPLETE CBC W/AUTO DIFF WBC: CPT

## 2025-03-19 PROCEDURE — 6370000000 HC RX 637 (ALT 250 FOR IP): Performed by: STUDENT IN AN ORGANIZED HEALTH CARE EDUCATION/TRAINING PROGRAM

## 2025-03-19 PROCEDURE — 0CB3XZX EXCISION OF SOFT PALATE, EXTERNAL APPROACH, DIAGNOSTIC: ICD-10-PCS | Performed by: STUDENT IN AN ORGANIZED HEALTH CARE EDUCATION/TRAINING PROGRAM

## 2025-03-19 RX ORDER — SODIUM CHLORIDE, SODIUM LACTATE, POTASSIUM CHLORIDE, CALCIUM CHLORIDE 600; 310; 30; 20 MG/100ML; MG/100ML; MG/100ML; MG/100ML
INJECTION, SOLUTION INTRAVENOUS CONTINUOUS
Status: DISCONTINUED | OUTPATIENT
Start: 2025-03-19 | End: 2025-03-20

## 2025-03-19 RX ORDER — NICOTINE 21 MG/24HR
1 PATCH, TRANSDERMAL 24 HOURS TRANSDERMAL DAILY
Status: DISCONTINUED | OUTPATIENT
Start: 2025-03-19 | End: 2025-03-21 | Stop reason: HOSPADM

## 2025-03-19 RX ADMIN — Medication 1 TABLET: at 09:54

## 2025-03-19 RX ADMIN — SODIUM CHLORIDE, POTASSIUM CHLORIDE, SODIUM LACTATE AND CALCIUM CHLORIDE: 600; 310; 30; 20 INJECTION, SOLUTION INTRAVENOUS at 18:24

## 2025-03-19 RX ADMIN — CLOMIPRAMINE HYDROCHLORIDE 25 MG: 25 CAPSULE ORAL at 21:27

## 2025-03-19 RX ADMIN — DIVALPROEX SODIUM 1000 MG: 250 TABLET, DELAYED RELEASE ORAL at 21:27

## 2025-03-19 RX ADMIN — DIVALPROEX SODIUM 1000 MG: 250 TABLET, DELAYED RELEASE ORAL at 09:54

## 2025-03-19 RX ADMIN — ENOXAPARIN SODIUM 40 MG: 100 INJECTION SUBCUTANEOUS at 09:54

## 2025-03-19 RX ADMIN — Medication 10 ML: at 13:35

## 2025-03-19 RX ADMIN — SENNOSIDES AND DOCUSATE SODIUM 1 TABLET: 50; 8.6 TABLET ORAL at 09:54

## 2025-03-19 RX ADMIN — ACETAMINOPHEN 650 MG: 325 TABLET ORAL at 21:27

## 2025-03-19 RX ADMIN — SODIUM CHLORIDE, PRESERVATIVE FREE 10 ML: 5 INJECTION INTRAVENOUS at 21:27

## 2025-03-19 RX ADMIN — SULFUR HEXAFLUORIDE 2 ML: 60.7; .19; .19 INJECTION, POWDER, LYOPHILIZED, FOR SUSPENSION INTRAVENOUS; INTRAVESICAL at 13:34

## 2025-03-19 RX ADMIN — CLINDAMYCIN PHOSPHATE 300 MG: 300 INJECTION, SOLUTION INTRAVENOUS at 21:31

## 2025-03-19 RX ADMIN — CLINDAMYCIN PHOSPHATE 300 MG: 300 INJECTION, SOLUTION INTRAVENOUS at 05:55

## 2025-03-19 RX ADMIN — DIVALPROEX SODIUM 1000 MG: 250 TABLET, DELAYED RELEASE ORAL at 00:02

## 2025-03-19 RX ADMIN — SODIUM CHLORIDE, PRESERVATIVE FREE 10 ML: 5 INJECTION INTRAVENOUS at 09:53

## 2025-03-19 RX ADMIN — CETIRIZINE HYDROCHLORIDE 10 MG: 10 TABLET, FILM COATED ORAL at 09:54

## 2025-03-19 RX ADMIN — CLINDAMYCIN PHOSPHATE 300 MG: 300 INJECTION, SOLUTION INTRAVENOUS at 15:24

## 2025-03-19 RX ADMIN — SODIUM CHLORIDE, PRESERVATIVE FREE 10 ML: 5 INJECTION INTRAVENOUS at 00:03

## 2025-03-19 RX ADMIN — CLOMIPRAMINE HYDROCHLORIDE 25 MG: 25 CAPSULE ORAL at 00:02

## 2025-03-19 RX ADMIN — SODIUM CHLORIDE, POTASSIUM CHLORIDE, SODIUM LACTATE AND CALCIUM CHLORIDE: 600; 310; 30; 20 INJECTION, SOLUTION INTRAVENOUS at 09:52

## 2025-03-19 ASSESSMENT — PAIN SCALES - GENERAL: PAINLEVEL_OUTOF10: 5

## 2025-03-19 NOTE — PROGRESS NOTES
Medication Reconciliation     List of medications patient is currently taking is complete.     Source of information: 1. Conversation with patient at bedside                                      2. EPIC records      Allergies  Patient has no known allergies.     Notes regarding home medications:  1. No answer at [facility] ([phone]) so cannot determine when medications were last administered. Pt states he cannot recall when he last took medications.    Trinidad Kirkpatrick, Pharmacy Intern  3/18/2025 9:59 PM

## 2025-03-19 NOTE — PROGRESS NOTES
Pt is back from ECHO. He's A&O, stable and resting comfortably in bed with bed is low position and bed alarm on. Will continue to monitor.

## 2025-03-19 NOTE — CARE COORDINATION
Case Management Assessment  Initial Evaluation    Date/Time of Evaluation: 3/19/2025 2:12 PM  Assessment Completed by: MARIA ANTONIA Wynne    If patient is discharged prior to next notation, then this note serves as note for discharge by case management.    Patient Name: Arturo Sweeney                   YOB: 1960  Diagnosis: Syncope and collapse [R55]  Oral ulcer [K12.1]  Dental infection [K04.7]                   Date / Time: 3/18/2025  4:41 PM    Patient Admission Status: Inpatient   Readmission Risk (Low < 19, Mod (19-27), High > 27): Readmission Risk Score: 5.9    Current PCP: No primary care provider on file.  PCP verified by CM? (P) Yes    Chart Reviewed: Yes      History Provided by: (P) Patient  Patient Orientation: (P) Alert and Oriented    Patient Cognition: (P) Alert    Hospitalization in the last 30 days (Readmission):  No    If yes, Readmission Assessment in CM Navigator will be completed.    Advance Directives:      Code Status: Full Code   Patient's Primary Decision Maker is: (P) Named in Scanned ACP Document    Primary Decision Maker: ankita vizcarra - Brother/Sister, Legal Guardian - [phone]    Secondary Decision Maker: Vic Sweeney - Brother/Sister - [phone]    Secondary Decision Maker: Lefty Sweeneyssica - Child - [phone]    Discharge Planning:    Current Nursing Home Information:  Patient admitted from:  [facility]     Call to kim Soto, at [facility] who confirmed the patient is: Long Term Care, no Precert required for return.      Patient Covid vaccination status:    Internal Administration   First Dose COVID-19, PFIZER PURPLE top, DILUTE for use, (age 12 y+), 30mcg/0.3mL  01/04/2021   Second Dose COVID-19, PFIZER PURPLE top, DILUTE for use, (age 12 y+), 30mcg/0.3mL   01/25/2021       Last COVID Lab SARS-CoV-2, PCR (no units)   Date Value   06/21/2020 Not Detected            Covid Test requirement for return: No Rapid/PCR Covid test    Primary Caregiver Self   Accompanied By/Relationship self   Support Systems Family Members   Patient's Healthcare Decision Maker is: Named in Scanned ACP Document   PCP Verified by CM Yes   Last Visit to PCP Within last 3 months   Prior Functional Level Assistance with the following:;Cooking;Housework;Shopping;Mobility;Bathing   Current Functional Level Assistance with the following:;Cooking;Housework;Shopping;Mobility;Bathing   Can patient return to prior living arrangement Yes   Ability to make needs known: Good   Family able to assist with home care needs: Other (comment)  (from LTC at [facility])   Would you like for me to discuss the discharge plan with any other family members/significant others, and if so, who? Yes  (call placed to [employer])   Financial Resources None   Community Resources None   CM/SW Referral ADLs/IADLs   Discharge Planning   Type of Residence Long-Term Care   Living Arrangements Other (Comment)  ([facility])   Current Services Prior To Admission Intermediate Care   Potential Assistance Needed Intermediate Care   DME Ordered? No   Potential Assistance Purchasing Medications No   Type of Home Care Services None   Patient expects to be discharged to: Long-term care   History of falls? 1   Services At/After Discharge   Transition of Care Consult (CM Consult) Long Term Care   Services At/After Discharge Long term care   Mode of Transport at Discharge BLS

## 2025-03-19 NOTE — PROGRESS NOTES
Occupational Therapy  Facility/Department: [address] MED SURG  Occupational Therapy Initial Assessment    Name: Arturo Sweeney  : 1960  MRN: [id number]  Date of Service: 3/19/2025    Discharge Recommendations:  Long Term Care without OT     Arturo Sweeney scored a  on the -[facility] ADL Inpatient form.  At this time, no further OT is recommended upon discharge due to patient functioning close to baseline.  Recommend patient returns to prior setting with prior services.        Patient Diagnosis(es): The primary encounter diagnosis was Syncope and collapse. Diagnoses of Oral ulcer, Dental infection, and Syncope, unspecified syncope type were also pertinent to this visit.  Past Medical History:  has a past medical history of ETOH abuse, Hyperlipidemia, Left-sided low back pain  , and Normal delivery.  Past Surgical History:  has a past surgical history that includes knee surgery (Left, ).    Treatment Diagnosis: decreased fxl mobility/ADLs    Assessment  Performance deficits / Impairments: Decreased functional mobility ;Decreased balance;Decreased ADL status;Decreased cognition;Decreased endurance;Decreased safe awareness  Assessment: Per H&P note on 3/18, \"Arturo Sweeney is a 65 y.o. male with cognitive impairment, history of subdural hematoma, history of alcohol abuse in the past was scented to ER from nursing home for suspected syncopal episode. As per information patient was walking out of the elevator when he collapsed. Patient states that he felt weak in his legs and fell down. Patient denies losing consciousness.\" PMHx: Alcohol dependence with dementia, retinal vasculitis, hypertensive HF, generalized muscle weakness, symbolic dysfunctions, hypercholesterolemia, tobacco use, atherosclerotic heart disease, COPD, difficulty walking, nontraumatic SAH, bipolar and schizophrenia, persistent mood disorder. PTA, pt living at [facility] where he is typically independent with all ADLs and fxl mobility no  device. This date, pt functioning below baseline requiring SBA bed mobility, SB/CGA fxl transfers, CGA fxl mobility without device. slightly unsteady gait initially but no overt LOB noted. Pt completes LB dressing/footwear SBA and sink side grooming CGA. Anticipate pt to require up to CGA for full ADLs based on strength, balance, endurance and ROM observed this date. cont acute OT to address above deficits. anticipate pt will be safe to return to [facility] without OT at discharge.  Treatment Diagnosis: decreased fxl mobility/ADLs  Prognosis: Good  Decision Making: Medium Complexity  History: see above  REQUIRES OT FOLLOW-UP: Yes  Activity Tolerance  Activity Tolerance: Patient Tolerated treatment well  Activity Tolerance Comments: orthostatics taken this date (see vitals section for details); pt denies dizziness throughout session     Plan  Occupational Therapy Plan  Times Per Week: 3-5x  Current Treatment Recommendations: Strengthening, Balance training, Functional mobility training, Endurance training, Safety education & training, Patient/Caregiver education & training, Gait training, Equipment evaluation, education, & procurement, Self-Care / ADL    Restrictions  Restrictions/Precautions  Restrictions/Precautions: Fall Risk    Subjective  General  Chart Reviewed: Yes  Patient assessed for rehabilitation services?: Yes  Additional Pertinent Hx: Per H&P note on 3/18, \"Arturo Sweeney is a 65 y.o. male with cognitive impairment, history of subdural hematoma, history of alcohol abuse in the past was scented to ER from nursing home for suspected syncopal episode.  As per information patient was walking out of the elevator when he collapsed.  Patient states that he felt weak in his legs and fell down.  Patient denies losing consciousness.\" PMHx: Alcohol dependence with dementia, retinal vasculitis, hypertensive HF, generalized muscle weakness, symbolic dysfunctions, hypercholesterolemia, tobacco use,

## 2025-03-19 NOTE — PROGRESS NOTES
4 Eyes Skin Assessment     NAME:  Arturo Sweeney  YOB: 1960  MEDICAL RECORD NUMBER:  [id number]    The patient is being assessed for  Admission    I agree that at least one RN has performed a thorough Head to Toe Skin Assessment on the patient. ALL assessment sites listed below have been assessed.      Areas assessed by both nurses:    Head, Face, Ears, Shoulders, Back, Chest, Arms, Elbows, Hands, Sacrum. Buttock, Coccyx, Ischium, Legs. Feet and Heels, and Under Medical Devices         Does the Patient have a Wound? No noted wound(s)       Mamadou Prevention initiated by RN: No  Wound Care Orders initiated by RN: No    Pressure Injury (Stage 3,4, Unstageable, DTI, NWPT, and Complex wounds) if present, place Wound referral order by RN under : No    New Ostomies, if present place, Ostomy referral order under : No     Nurse 1 eSignature: Electronically signed by Amber Reich RN on 3/19/25 at 12:23 AM EDT    **SHARE this note so that the co-signing nurse can place an eSignature**    Nurse 2 eSignature: Electronically signed by Yolie Tam RN on 3/19/25 at 12:32 AM EDT

## 2025-03-19 NOTE — ED NOTES
ED TO INPATIENT SBAR HANDOFF    Patient Name: Arturo Sweeney   Preferred Name: Arturo  : 1960  65 y.o.   Family/Caregiver Present: no   Code Status Order: Full Code  PO Status: NPO:No  Telemetry Order:   C-SSRS: Risk of Suicide: No Risk  Sitter no     Restraints:     Sepsis Risk Score      Situation  Chief Complaint   Patient presents with    Loss of Consciousness     Syncope after walking out of an elevator, loss of bladder, no reports of injury, gradually has been getting weaker over the last several days.  Per EMS initial BP 95/60, .       Brief Description of Patient's Condition:  syncope   Mental Status: alert  Arrived from:Home  Imaging:   CT SOFT TISSUE NECK W CONTRAST   Final Result   At the right posterolateral aspect of the soft palate, hypodensity/ulceration   is present with mildly enhancing rim and dimensions of approximately 2.0 x   2.0 x 1.1 cm.  This mildly involves the right faucial tonsil as well with   mild edema in the right parapharyngeal space.  Although likely infectious in   etiology, neoplasm cannot be entirely excluded.  If there is any concern in   this regard, follow-up postcontrast soft tissue neck CT is recommended 2-3   weeks following completion of antibiotic treatment.         CT HEAD WO CONTRAST   Final Result   No acute intracranial abnormality.         XR CHEST 1 VIEW   Final Result   No acute process.           Abnormal labs:   Abnormal Labs Reviewed   CBC WITH AUTO DIFFERENTIAL - Abnormal; Notable for the following components:       Result Value    WBC 11.2 (*)     Neutrophils Absolute 8.3 (*)     All other components within normal limits   COMPREHENSIVE METABOLIC PANEL - Abnormal; Notable for the following components:    Glucose 124 (*)     All other components within normal limits       Background  Allergies: No Known Allergies  History:   Past Medical History:   Diagnosis Date    ETOH abuse 2015    Hyperlipidemia 10/19/2015    Left-sided low back pain

## 2025-03-19 NOTE — CONSULTS
administration.    Procedure:  After verbal consent was obtained from the patient and his legal guardian Rhianna Blake, the lesion was anesthetized with topical 4% lidocaine. Cup forceps were used to obtain several samples of tissue and these were placed in formalin. Specimen was labeled.  An area of oozing was cauterized with silver nitrate.  EBL: minimal  Complications: none    ASSESSMENT/PLAN  Arturo is a very pleasant 65 y.o. male with right oropharyngeal lesion/oral cavity lesion.     65-year-old male with a history of tobacco use and alcohol use now with oral cavity/oropharyngeal lesion concerning for malignancy  -Biopsy taken today.  Follow-up pathology.  -Would recommend CT scan of the chest  with contrast if patient is able to have this obtained during admission to facilitate workup  -Recommend close outpatient follow-up with ENT discuss treatment options.  -Patient care limited due to social determinants of health, not being able to make his own medical decisions    I attest that I saw the patient and performed the critical portions of the history taking and physical exam, including imaging review.  The assessment and plan was formulated by myself. I was directly available while the biopsy was performed.     Medical Decision Making:  The following items were considered in medical decision making:  Independent review of images  Review / order clinical lab tests  Review / order radiology tests  Decision to obtain old records  Review and summation of old records as accessed through [employer] (a summary of my findings in these old records:

## 2025-03-19 NOTE — PLAN OF CARE
Problem: Safety - Adult  Goal: Free from fall injury  3/19/2025 1104 by Shyla Cortez, RN  Outcome: Progressing  3/19/2025 0022 by Amber Reich, RN  Outcome: Progressing   Pt's bed is in low position and bed alarm is on. Table, call light and personal items are within reach.

## 2025-03-19 NOTE — PROGRESS NOTES
Physical Therapy    [facility] - Physical Therapy   Phone: [phone]    Physical Therapy  Unit: [facility] 4W [facility]    [x] Initial Evaluation            [] Daily Treatment Note         [] Discharge Summary      Patient: Arturo Sweeney   : 1960   MRN: [id number]   Date of Service:  3/19/2025  If pt. is D/C'd prior to next visit please refer back to last daily progress note for D/C status.    Staff Mobility Recommendation: CGA with gait belt, no device    AM-PAC score: 20/24  Discharge Recommendations: Arturo Sweeney scored a 20/24 on the AM-PAC short mobility form.  At this time, no further PT is recommended upon discharge due to no anticipated skilled PT needs.  Recommend patient returns to prior setting with prior services.       Admitting Diagnosis: Syncope and collapse  Ordering Physician: Solomon Calvin MD   Current Admission Summary: The pt is a 64 yo male who is a LT resident at [facility]. The pt was brought to the ED following a syncopal episode at the [facility] when the pt was walking to the elevator.   Past Medical History:  has a past medical history of ETOH abuse, Hyperlipidemia, Left-sided low back pain  , and Normal delivery.  Past Surgical History:  has a past surgical history that includes knee surgery (Left, ).    Assessment  Activity Tolerance: Good  Impairments Requiring Therapeutic Intervention: decreased functional mobility, decreased safety awareness, decreased cognition, decreased balance  Prognosis: good    Clinical Assessment: The pt is a 64 yo male who is a LT resident at [facility]. The pt was brought to the ED following a syncopal episode at the [facility] when the pt was walking to the elevator. The pt is from LTC facility and PTA, was indep in self-care and in mobility w/o a device. Today, the pt is demonstrating that he is functioning close to or at his baseline and was able to complete bed mobility with SBA; sit <> stand transfers with CGA/SBA and vc's for  time, and oriented to situation    Education  Barriers To Learning: cognition and hearing  Patient Education: patient educated on PT role and benefits, plan of care, precautions, general safety, functional mobility training, orientation, transfer training  Learning Assessment:  patient verbalizes understanding, would benefit from continued reinforcement  Safety Interventions: patient left in chair, chair alarm in place, call light within reach, gait belt, patient at risk for falls, nurse notified, and family/caregiver present    Plan  Frequency: 2-3 x/week  Current Treatment Recommendations: balance training, functional mobility training, transfer training, gait training, and patient/caregiver education    Goals  Patient Goals: to be able to smoke   Short Term Goals:  Time Frame: upon d/c  Patient will complete bed mobility at modified independent   Patient will complete transfers at modified independent   Patient will ambulate 200 ft with use of no device at supervision, stand by assistance    Above goals reviewed on 3/19/2025.  All goals are ongoing at this time unless indicated above.      Therapy Session Time      Individual Group Co-treatment   Time In 0832       Time Out 0904       Minutes 32         Timed Code Treatment Minutes:  17 Minutes  Total Treatment Minutes:  32 minutes      Electronically signed by Maryellen Palomares, PT [id number] on 3/19/2025 at 9:09 AM

## 2025-03-19 NOTE — PLAN OF CARE
Problem: Discharge Planning  Goal: Discharge to home or other facility with appropriate resources  Outcome: Progressing  Flowsheets (Taken 3/18/2025 9774)  Discharge to home or other facility with appropriate resources:   Identify barriers to discharge with patient and caregiver   Arrange for needed discharge resources and transportation as appropriate   Identify discharge learning needs (meds, wound care, etc)   Refer to discharge planning if patient needs post-hospital services based on physician order or complex needs related to functional status, cognitive ability or social support system     Problem: Safety - Adult  Goal: Free from fall injury  Outcome: Progressing     Problem: Neurosensory - Adult  Goal: Achieves stable or improved neurological status  Outcome: Progressing  Goal: Achieves maximal functionality and self care  Outcome: Progressing     Problem: Respiratory - Adult  Goal: Achieves optimal ventilation and oxygenation  Outcome: Progressing     Problem: Cardiovascular - Adult  Goal: Maintains optimal cardiac output and hemodynamic stability  Outcome: Progressing  Goal: Absence of cardiac dysrhythmias or at baseline  Outcome: Progressing     Problem: Skin/Tissue Integrity - Adult  Goal: Skin integrity remains intact  Outcome: Progressing  Goal: Incisions, wounds, or drain sites healing without S/S of infection  Outcome: Progressing  Goal: Oral mucous membranes remain intact  Outcome: Progressing     Problem: Musculoskeletal - Adult  Goal: Return mobility to safest level of function  Outcome: Progressing  Goal: Maintain proper alignment of affected body part  Outcome: Progressing  Goal: Return ADL status to a safe level of function  Outcome: Progressing     Problem: Gastrointestinal - Adult  Goal: Minimal or absence of nausea and vomiting  Outcome: Progressing  Goal: Maintains or returns to baseline bowel function  Outcome: Progressing  Goal: Maintains adequate nutritional intake  Outcome:

## 2025-03-19 NOTE — H&P
History and Physical      Name:  Arturo Sweeney /Age/Sex: 1960  (65 y.o. male)   MRN & CSN:  [id number] & [id number] Encounter Date/Time: 3/18/2025 10:26 PM EDT   Location:  [address] [place] [address] PCP: No primary care provider on file.       Hospital Day: 1    Assessment and Plan:     #.  Syncope  -CT head-no acute intracranial abnormality  -Troponin 21, 18  -EKG with no acute ST-T wave changes  -Orthostatic vitals ordered.  -As per information patient also had a loss of bladder, no seizure-like activity reported.    #.  Oral ulcer  -Patient noted to have ulcer on the right side of the tonsillar//soft palate  -CT soft tissue neck with contrast done in ED-reported right posterolateral aspect of the soft palate there is a hypodensity/ulceration with a mildly enhancing rim and dimensions of approximately 2 x 2 x 1.1 cm  -Continue clindamycin empirically  -ENT consult    #.  Cognitive impairment thought to be secondary to her alcohol use  -Patient is currently on clomipramine, divalproex    #.  History of subdural hematoma-2020    #.  History of hypertension-currently not on any antihypertensives  Disposition:   Current Living situation: [facility]    Diet Cardiac diet   DVT Prophylaxis [x] Lovenox   Code Status DNR CC as per paperwork sent from nursing home   Surrogate Decision Maker/ POA      History from:   EMR, patient.    History of Present Illness:     Chief Complaint: Syncope and collapse  Arturo Sweeney is a 65 y.o. male with cognitive impairment, history of subdural hematoma, history of alcohol abuse in the past was scented to ER from nursing [place] for suspected syncopal episode.  As per information patient was walking out of the elevator when he collapsed.  Patient states that he felt weak in his legs and fell down.  Patient denies losing consciousness.  But unable to confirm this.  Patient is requesting to smoke.  Patient currently denying any headache, nausea, vomiting, fever, chills, cough, chest  03/18/25  1656   AST 23   ALT 12   BILITOT 0.4   ALKPHOS 53     Lipids:   Lab Results   Component Value Date/Time    CHOL 242 02/07/2017 09:33 AM     02/07/2017 09:33 AM    TRIG 77 02/07/2017 09:33 AM     Hemoglobin A1C: No results found for: \"LABA1C\"  TSH: No results found for: \"TSH\"  Troponin: No results found for: \"TROPONINT\"  Lactic Acid: No results for input(s): \"LACTA\" in the last 72 hours.  BNP: No results for input(s): \"PROBNP\" in the last 72 hours.  UA:  Lab Results   Component Value Date/Time    NITRU Negative 06/21/2020 11:27 AM    COLORU YELLOW 06/21/2020 11:27 AM    PHUR 6.5 06/21/2020 11:27 AM    PHUR 6.5 06/21/2020 11:27 AM    CLARITYU Clear 06/21/2020 11:27 AM    LEUKOCYTESUR Negative 06/21/2020 11:27 AM    UROBILINOGEN 0.2 06/21/2020 11:27 AM    BILIRUBINUR Negative 06/21/2020 11:27 AM    BLOODU Negative 06/21/2020 11:27 AM    GLUCOSEU Negative 06/21/2020 11:27 AM    KETUA Negative 06/21/2020 11:27 AM     Urine Cultures: No results found for: \"LABURIN\"  Blood Cultures: No results found for: \"BC\"  No results found for: \"BLOODCULT2\"  Organism: No results found for: \"ORG\"    Imaging/Diagnostics Last 24 Hours   CT HEAD WO CONTRAST  Result Date: 3/18/2025  EXAMINATION: CT OF THE HEAD WITHOUT CONTRAST  3/18/2025 6:16 pm TECHNIQUE: CT of the head was performed without the administration of intravenous contrast. Automated exposure control, iterative reconstruction, and/or weight based adjustment of the mA/kV was utilized to reduce the radiation dose to as low as reasonably achievable. COMPARISON: 06/21/2020 HISTORY: ORDERING SYSTEM PROVIDED HISTORY: syncope TECHNOLOGIST PROVIDED HISTORY: Has a \"code stroke\" or \"stroke alert\" been called?->No Reason for exam:->syncope Reason for Exam: syncope FINDINGS: BRAIN/VENTRICLES: There is no acute intracranial hemorrhage, mass effect or midline shift.  No abnormal extra-axial fluid collection.  The gray-white differentiation is maintained without evidence of an

## 2025-03-19 NOTE — PROGRESS NOTES
V2.0    [facility] Progress Note      Name:  Arturo Sweeney /Age/Sex: 1960  (65 y.o. male)   MRN & CSN:  [id number] & [id number] Encounter Date/Time: 3/19/2025 7:53 AM EDT   Location:  [id number]-5462/4114-01 PCP: No primary care provider on file.     Attending:Brandin Raymond DO       Hospital Day: 2  Brief Hospital Course  Arturo Sweeney is a 65 y.o. male with pertinent PMHx of mood disorder, COPD, alcohol use disorder, HTN who presented complaining of syncopal episode at nursing facility.  Assessment & Plan     Syncope  Orthostatic hypotension  - Significant BP drop with orthostatics  - Orthostatic education provided at bedside  - IV fluid resuscitation today and recheck orthostatic vitals  - Check echocardiogram    Oral ulcer  - Patient does have a significant tobacco use history  - CT showed hypodensity versus ulceration with a mildly enhancing rim of approximately 2 x 2 x 1.1 cm  -Check blood cultures  - ENT consulted  - Continue daptomycin for now    Nicotine dependence  - Nicotine replacement patch  - Smoking cessation encouraged    Goals of care  -CODE STATUS was discussed with patient and sister who is legal guardian at bedside, status was confirmed as full code      Continue other home medications for chronic stable medical conditions  Diet ADULT DIET; Regular; Low Fat/Low Chol/High Fiber/2 gm Na   DVT Prophylaxis [x] Lovenox, []  Heparin, [] SCDs, [] Ambulation,  [] Eliquis, [] Xarelto  [] Coumadin   Code Status Full Code   Disposition From: Long-term care  Expected Disposition: Long-term care  Estimated Date of Discharge: 3/20-3/21           Subjective:     Chief Complaint: Syncope    Patient was seen and examined today sitting up in chair in room with sister at bedside  Patient denies any symptoms such as lightheadedness or dizziness  Says that he did not feel lightheaded or dizzy when orthostatics were checked  Denies any recent illness or fevers or chills  Does not say that the ulcer is bothersome  abnormality.  There is mucosal thickening of the maxillary sinuses. SOFT TISSUES/SKULL:  No acute abnormality of the visualized skull or soft tissues.     No acute intracranial abnormality.     XR CHEST 1 VIEW  Result Date: 3/18/2025  EXAMINATION: ONE XRAY VIEW OF THE CHEST 3/18/2025 5:05 pm COMPARISON: 06/21/2020 HISTORY: ORDERING SYSTEM PROVIDED HISTORY: Dizziness/Syncope TECHNOLOGIST PROVIDED HISTORY: Reason for exam:->Dizziness/Syncope Reason for Exam: syncope FINDINGS: The lungs are without acute focal process.  There is no effusion or pneumothorax. The cardiomediastinal silhouette is stable. The osseous structures are stable.     No acute process.       CBC:   Recent Labs     03/18/25  1656   WBC 11.2*   HGB 14.7        BMP:    Recent Labs     03/18/25  1656      K 4.2      CO2 24   BUN 8   CREATININE 1.1   GLUCOSE 124*     Hepatic:   Recent Labs     03/18/25  1656   AST 23   ALT 12   BILITOT 0.4   ALKPHOS 53     Lipids:   Lab Results   Component Value Date/Time    CHOL 242 02/07/2017 09:33 AM     02/07/2017 09:33 AM    TRIG 77 02/07/2017 09:33 AM     Hemoglobin A1C: No results found for: \"LABA1C\"  TSH: No results found for: \"TSH\"  Troponin: No results found for: \"TROPONINT\"  Lactic Acid: No results for input(s): \"LACTA\" in the last 72 hours.  BNP: No results for input(s): \"PROBNP\" in the last 72 hours.  UA:  Lab Results   Component Value Date/Time    NITRU Negative 06/21/2020 11:27 AM    COLORU YELLOW 06/21/2020 11:27 AM    PHUR 6.5 06/21/2020 11:27 AM    PHUR 6.5 06/21/2020 11:27 AM    CLARITYU Clear 06/21/2020 11:27 AM    LEUKOCYTESUR Negative 06/21/2020 11:27 AM    UROBILINOGEN 0.2 06/21/2020 11:27 AM    BILIRUBINUR Negative 06/21/2020 11:27 AM    BLOODU Negative 06/21/2020 11:27 AM    GLUCOSEU Negative 06/21/2020 11:27 AM    KETUA Negative 06/21/2020 11:27 AM     Urine Cultures: No results found for: \"LABURIN\"  Blood Cultures: No results found for: \"BC\"  No results found for:

## 2025-03-19 NOTE — PROGRESS NOTES
Patient arrives to [facility] via transport. Patient's sister Talya is bedside. Sister states she is patient's legal guardian. Sister is asked to bring documentation to scan into chart, she is agreeable to this. Patient displays no s/s of acute distress. VSS. Patient is alert to self and pleasantly intermittently confused at this time. Patient's sister answers most of admission questions. 4 eyes exam is completed. Images of patient's sacrum and heels are collected by assistance of secondary nurse at this time. Patient is stood during collection of images, patient is unsteady, assisted back to bed at this time. Orthostatic BP is unable to be collected due to patient being unsteady on feet at this time. Fall bracelet and fall precautions are in place. Patient is assessed. Patient is oriented to room at this time. Portable telemetry is placed and confirmed by primary nurse. Unable to complete medication reconciliation at this time due to [facility] not answering. Bed is locked in lowest position and armed. Gripper socks are applied. Call light is in reach. Patient verbalized no further needs.     Electronically signed by Amber Reich RN on 3/19/2025 at 12:30 AM

## 2025-03-20 PROCEDURE — 97116 GAIT TRAINING THERAPY: CPT

## 2025-03-20 PROCEDURE — 6370000000 HC RX 637 (ALT 250 FOR IP): Performed by: STUDENT IN AN ORGANIZED HEALTH CARE EDUCATION/TRAINING PROGRAM

## 2025-03-20 PROCEDURE — 2500000003 HC RX 250 WO HCPCS: Performed by: INTERNAL MEDICINE

## 2025-03-20 PROCEDURE — 6370000000 HC RX 637 (ALT 250 FOR IP): Performed by: INTERNAL MEDICINE

## 2025-03-20 PROCEDURE — 88305 TISSUE EXAM BY PATHOLOGIST: CPT

## 2025-03-20 PROCEDURE — 6370000000 HC RX 637 (ALT 250 FOR IP): Performed by: FAMILY MEDICINE

## 2025-03-20 PROCEDURE — 1200000000 HC SEMI PRIVATE

## 2025-03-20 PROCEDURE — 6370000000 HC RX 637 (ALT 250 FOR IP)

## 2025-03-20 PROCEDURE — 6360000002 HC RX W HCPCS: Performed by: INTERNAL MEDICINE

## 2025-03-20 PROCEDURE — 88342 IMHCHEM/IMCYTCHM 1ST ANTB: CPT

## 2025-03-20 PROCEDURE — 94760 N-INVAS EAR/PLS OXIMETRY 1: CPT

## 2025-03-20 RX ORDER — OLANZAPINE 5 MG/1
5 TABLET ORAL
Status: DISCONTINUED | OUTPATIENT
Start: 2025-03-20 | End: 2025-03-21 | Stop reason: HOSPADM

## 2025-03-20 RX ORDER — TRAMADOL HYDROCHLORIDE 50 MG/1
50 TABLET ORAL EVERY 6 HOURS PRN
Status: DISCONTINUED | OUTPATIENT
Start: 2025-03-20 | End: 2025-03-21 | Stop reason: HOSPADM

## 2025-03-20 RX ADMIN — CLINDAMYCIN PHOSPHATE 300 MG: 300 INJECTION, SOLUTION INTRAVENOUS at 05:35

## 2025-03-20 RX ADMIN — CETIRIZINE HYDROCHLORIDE 10 MG: 10 TABLET, FILM COATED ORAL at 08:50

## 2025-03-20 RX ADMIN — Medication 1 TABLET: at 08:50

## 2025-03-20 RX ADMIN — SENNOSIDES AND DOCUSATE SODIUM 1 TABLET: 50; 8.6 TABLET ORAL at 08:50

## 2025-03-20 RX ADMIN — ENOXAPARIN SODIUM 40 MG: 100 INJECTION SUBCUTANEOUS at 08:49

## 2025-03-20 RX ADMIN — ACETAMINOPHEN 650 MG: 325 TABLET ORAL at 20:44

## 2025-03-20 RX ADMIN — DIVALPROEX SODIUM 1000 MG: 250 TABLET, DELAYED RELEASE ORAL at 08:49

## 2025-03-20 RX ADMIN — CLINDAMYCIN PHOSPHATE 300 MG: 300 INJECTION, SOLUTION INTRAVENOUS at 13:55

## 2025-03-20 RX ADMIN — SODIUM CHLORIDE, PRESERVATIVE FREE 10 ML: 5 INJECTION INTRAVENOUS at 21:53

## 2025-03-20 RX ADMIN — TRAMADOL HYDROCHLORIDE 50 MG: 50 TABLET, COATED ORAL at 17:25

## 2025-03-20 RX ADMIN — Medication 1 LOZENGE: at 22:19

## 2025-03-20 RX ADMIN — CLOMIPRAMINE HYDROCHLORIDE 25 MG: 25 CAPSULE ORAL at 20:44

## 2025-03-20 RX ADMIN — CLINDAMYCIN PHOSPHATE 300 MG: 300 INJECTION, SOLUTION INTRAVENOUS at 20:47

## 2025-03-20 RX ADMIN — ACETAMINOPHEN 650 MG: 325 TABLET ORAL at 08:50

## 2025-03-20 RX ADMIN — DIVALPROEX SODIUM 1000 MG: 250 TABLET, DELAYED RELEASE ORAL at 20:44

## 2025-03-20 RX ADMIN — SODIUM CHLORIDE, PRESERVATIVE FREE 10 ML: 5 INJECTION INTRAVENOUS at 08:49

## 2025-03-20 ASSESSMENT — PAIN SCALES - GENERAL
PAINLEVEL_OUTOF10: 6
PAINLEVEL_OUTOF10: 8
PAINLEVEL_OUTOF10: 8
PAINLEVEL_OUTOF10: 10
PAINLEVEL_OUTOF10: 10
PAINLEVEL_OUTOF10: 5
PAINLEVEL_OUTOF10: 5

## 2025-03-20 ASSESSMENT — PAIN DESCRIPTION - LOCATION
LOCATION: MOUTH;JAW
LOCATION: MOUTH
LOCATION: MOUTH;JAW

## 2025-03-20 ASSESSMENT — PAIN DESCRIPTION - DESCRIPTORS
DESCRIPTORS: ACHING;DISCOMFORT
DESCRIPTORS: ACHING

## 2025-03-20 ASSESSMENT — PAIN DESCRIPTION - ORIENTATION
ORIENTATION: RIGHT
ORIENTATION: RIGHT

## 2025-03-20 NOTE — PROGRESS NOTES
Patient is currently jumping out of the bed and ambulating to hallway in search of exit to go out side to smoke cigarette. Patient is redirected to room and attempted to be reoriented to situation and that facility is non smoking. Patient verbalizes understanding. Patient's sister Radha calls with concerns that patient did not urinated while she was present during day shift. Radha is informed patient has since voided. Patient is educated that he has a nicotine patch on, to help with nicotine craving. Telecam is ordered at this time  for patient's safety. Patient is repositioned and provided call light. Informed to please call with needs and not to get out of bed with out assistance due to being fall risk. Bed is locked, in lowest position and armed.     Electronically signed by Amber Reich RN on 3/19/2025 at 11:38 PM

## 2025-03-20 NOTE — PROGRESS NOTES
Physical Therapy    [facility] - Physical Therapy   Phone: [phone]    Physical Therapy  Unit: [facility] 4W [facility]    [] Initial Evaluation            [x] Daily Treatment Note         [] Discharge Summary      Patient: Arturo Sweeney   : 1960   MRN: [id number]   Date of Service:  3/20/2025  If pt. is D/C'd prior to next visit please refer back to last daily progress note for D/C status.    Staff Mobility Recommendation: CGA with gait belt, no device    AM-PAC score: 20/24  Discharge Recommendations: Arturo Sweeney scored a 20/24 on the AM-PAC short mobility form.  At this time, no further PT is recommended upon discharge due to no anticipated skilled PT needs.  Recommend patient returns to prior setting with prior services.       Admitting Diagnosis: Syncope and collapse  Ordering Physician: Solomon Calvin MD   Current Admission Summary: The pt is a 64 yo male who is a LT resident at [facility]. The pt was brought to the ED following a syncopal episode at the [facility] when the pt was walking to the elevator.   Past Medical History:  has a past medical history of ETOH abuse, Hyperlipidemia, Left-sided low back pain  , Mood disorder, Normal delivery, and Schizophrenia ([facility]).  Past Surgical History:  has a past surgical history that includes knee surgery (Left, ).    Assessment  Activity Tolerance: Good  Impairments Requiring Therapeutic Intervention: decreased functional mobility, decreased safety awareness, decreased cognition, decreased balance  Prognosis: good    Clinical Assessment: The pt is a 64 yo male who is a LT resident at [facility]. The pt was brought to the ED following a syncopal episode at the [facility] when the pt was walking to the elevator. The pt is from LT facility and PTA, was indep in self-care and in mobility w/o a device. Today, the pt was limited by dizziness after standing to urinate. He performed transfers and gait without AD with CGA. He demonstrated guarded  and nurse notified, telesitter present, family member walked in as PT was walking down hallway    Plan  Frequency: 2-3 x/week  Current Treatment Recommendations: balance training, functional mobility training, transfer training, gait training, and patient/caregiver education    Goals  Patient Goals: to be able to smoke   Short Term Goals:  Time Frame: upon d/c  Patient will complete bed mobility at modified independent   Patient will complete transfers at [facility]   Patient will ambulate 200 ft with use of no device at supervision, stand by assistance    Above goals reviewed on 3/20/2025.  All goals are ongoing at this time unless indicated above.      Therapy Session Time      Individual Group Co-treatment   Time In 1511       Time Out 1527       Minutes 16         Timed Code Treatment Minutes:  16 Minutes  Total Treatment Minutes:  32 minutes      Electronically signed by Mable Brewer, PT [id number] on 3/20/2025 at 3:31 PM

## 2025-03-20 NOTE — PROGRESS NOTES
V2.0    [facility] Progress Note      Name:  Arturo Sweeney /Age/Sex: 1960  (65 y.o. male)   MRN & CSN:  [id number] & [id number] Encounter Date/Time: 3/20/2025 11:11 AM EDT   Location:  [id number]-0960/4114-01 PCP: No primary care provider on file.     Attending:Naya Sousa MD       Hospital Day: 3    Assessment and Recommendations   Arturo Sweeney is a 65 y.o. male with pmh of HTN, COPD, mood disorder who presents with Syncope and collapse    Patient was evaluated this morning.  Continues to complain of mouth pain due to ulcer.  ENT consulted for their recommendations.  Most likely plan to see patient today.  Family is concerned would like the ulcer biopsied.  Blood cultures show no growth to date  Patient does have a virtual sitter in the room due to trying to escape outside to smoke.  Patient was educated and reeducated on the need to stay in his room and to refrain from smoking.  Nicotine patch was placed    Plan:   Syncope and collapse  Most likely in setting of orthostatic hypotension      2.  Mouth ulcer  Unable to fully open mouth.  Might be due to the setting of chronic tobacco abuse  CT obtained to show hypodensity versus ulceration with a mildly enhancing ring approximately 2 x 2 x 1.1 cm  Patient started on clindamycin 300 mg 3 times a day  ENT consulted for their recommendations      3.  Mood disorder  Depakote 1 g twice daily  Anafranil 25 mg nightly    Will continue to follow, monitor and manage chronic medical condition with medication listed below  Diet ADULT DIET; Regular; Low Fat/Low Chol/High Fiber/2 gm Na   DVT Prophylaxis [] Lovenox, [x]  Heparin, [] SCDs, [x] Ambulation,  [] Eliquis, [] Xarelto  [] Coumadin   Code Status Full Code   Disposition From: SNF  Expected Disposition: SNF  Estimated Date of Discharge: 1-2 days  Patient requires continued admission due to further evaluation   Surrogate Decision Maker/ Rhianna Chicas     Personally reviewed Lab Studies and Imaging         Medical  Daily    sodium chloride flush  5-40 mL IntraVENous 2 times per day    enoxaparin  40 mg SubCUTAneous Daily    cetirizine  10 mg Oral Daily    clomiPRAMINE  25 mg Oral Nightly    divalproex  1,000 mg Oral BID    therapeutic multivitamin-minerals  1 tablet Oral Daily    sennosides-docusate sodium  1 tablet Oral Daily    clindamycin (CLEOCIN) IV  300 mg IntraVENous Q8H      Infusions:    sodium chloride       PRN Meds: sodium chloride flush, 5-40 mL, PRN  sodium chloride, , PRN  potassium chloride, 40 mEq, PRN   Or  potassium alternative oral replacement, 40 mEq, PRN   Or  potassium chloride, 10 mEq, PRN  magnesium sulfate, 2,000 mg, PRN  ondansetron, 4 mg, Q8H PRN   Or  ondansetron, 4 mg, Q6H PRN  polyethylene glycol, 17 g, Daily PRN  acetaminophen, 650 mg, Q6H PRN   Or  acetaminophen, 650 mg, Q6H PRN        Labs and Imaging   Echo (TTE) complete (PRN contrast/bubble/strain/3D)  Result Date: 3/19/2025    Left Ventricle: Normal left ventricular systolic function with a visually estimated EF of 60 - 65%. Left ventricle size is normal. Normal wall thickness. Normal wall motion. Normal diastolic function.   Right Ventricle: Right ventricle size is normal. Normal systolic function. TAPSE is normal. TAPSE is 1.7 cm. RV Peak S' is 15.1 cm/s.   Interatrial Septum: No interatrial shunt visualized with color Doppler. Agitated saline study was negative with and without provocation.   Aorta: Normal sized ascending aorta. Mildly dilated aortic root. Ao root diameter is 3.9 cm.   There are no significant valvular abnormalities appreciated in this study.     CT SOFT TISSUE NECK W CONTRAST  Result Date: 3/18/2025  EXAMINATION: CT OF THE NECK SOFT TISSUE WITH CONTRAST  3/18/2025 TECHNIQUE: CT of the neck was performed with the administration of intravenous contrast. Multiplanar reformatted images are provided for review. Automated exposure control, iterative reconstruction, and/or weight based adjustment of the mA/kV was utilized to

## 2025-03-20 NOTE — PLAN OF CARE
Problem: Discharge Planning  Goal: Discharge to home or other facility with appropriate resources  Outcome: Progressing     Problem: Safety - Adult  Goal: Free from fall injury  3/19/2025 2334 by Amber Reich RN  Outcome: Progressing  3/19/2025 1104 by Shyla Cortez RN  Outcome: Progressing     Problem: Neurosensory - Adult  Goal: Achieves stable or improved neurological status  Outcome: Progressing  Goal: Achieves maximal functionality and self care  Outcome: Progressing     Problem: Respiratory - Adult  Goal: Achieves optimal ventilation and oxygenation  Outcome: Progressing     Problem: Cardiovascular - Adult  Goal: Maintains optimal cardiac output and hemodynamic stability  Outcome: Progressing  Goal: Absence of cardiac dysrhythmias or at baseline  Outcome: Progressing     Problem: Skin/Tissue Integrity - Adult  Goal: Skin integrity remains intact  Description: 1.  Monitor for areas of redness and/or skin breakdown  2.  Assess vascular access sites hourly  3.  Every 4-6 hours minimum:  Change oxygen saturation probe site  4.  Every 4-6 hours:  If on nasal continuous positive airway pressure, respiratory therapy assess nares and determine need for appliance change or resting period  Outcome: Progressing  Flowsheets (Taken 3/19/2025 2333)  Skin Integrity Remains Intact:   Monitor for areas of redness and/or skin breakdown   Assess vascular access sites hourly  Goal: Incisions, wounds, or drain sites healing without S/S of infection  Outcome: Progressing  Goal: Oral mucous membranes remain intact  Outcome: Progressing     Problem: Musculoskeletal - Adult  Goal: Return mobility to safest level of function  Outcome: Progressing  Goal: Maintain proper alignment of affected body part  Outcome: Progressing  Goal: Return ADL status to a safe level of function  Outcome: Progressing     Problem: Gastrointestinal - Adult  Goal: Minimal or absence of nausea and vomiting  Outcome: Progressing  Goal: Maintains or  with assigned personnel  7. Initiate Psychosocial CNS and Spiritual Care consult, as indicated  Outcome: Progressing     Problem: Behavior  Goal: Pt/Family maintain appropriate behavior and adhere to behavioral management agreement, if implemented  Description: INTERVENTIONS:  1. Assess patient/family's coping skills and  non-compliant behavior (including use of illegal substances)  2. Notify security of behavior or suspected illegal substances which indicate the need for search of the family and/or belongings  3. Encourage verbalization of thoughts and concerns in a socially appropriate manner  4. Utilize positive, consistent limit setting strategies supporting safety of patient, staff and others  5. Encourage participation in the decision making process about the behavioral management agreement  6. If a visitor's behavior poses a threat to safety call refer to organization policy.  7. Initiate consult with , Psychosocial CNS, Spiritual Care as appropriate  Outcome: Progressing     Problem: Skin/Tissue Integrity  Goal: Skin integrity remains intact  Description: 1.  Monitor for areas of redness and/or skin breakdown  2.  Assess vascular access sites hourly  3.  Every 4-6 hours minimum:  Change oxygen saturation probe site  4.  Every 4-6 hours:  If on nasal continuous positive airway pressure, respiratory therapy assess nares and determine need for appliance change or resting period  Outcome: Progressing  Flowsheets (Taken 3/19/2025 6988)  Skin Integrity Remains Intact:   Monitor for areas of redness and/or skin breakdown   Assess vascular access sites hourly

## 2025-03-20 NOTE — PLAN OF CARE
Problem: Safety - Adult  Goal: Free from fall injury  3/20/2025 0858 by Shyla Cortez, RN  Outcome: Progressing  3/19/2025 2334 by Amber Reich, RN  Outcome: Progressing     Problem: Pain  Goal: Verbalizes/displays adequate comfort level or baseline comfort level  Outcome: Progressing

## 2025-03-20 NOTE — CARE COORDINATION
Discharge Planning:  SW spoke with pts legal guardian, Rhianna \"Talya\".  Rhianna is agreeable to pt returning to [facility] at d/c.  Rhianna would like to have any testing from ENT prior to d/c.  PLAN: Return to [facility] LTC at d/c.  No pre cert needed.  Will need BLS transport and will need to keep pts legal guardian updated.  MED Foote ALONDRA  [phone]  Electronically signed by MED Duenas on 3/20/2025 at 9:28 AM

## 2025-03-20 NOTE — ACP (ADVANCE CARE PLANNING)
Advance Care Planning   Healthcare Decision Maker:    Primary Decision Maker: Rhianna vizcarra \"ankita\" - Brother/Sister, Legal Guardian - [phone]      Today we documented Decision Maker(s) consistent with ACP documents on file.       MED Foote  [phone]  Electronically signed by MED Duenas on 3/20/2025 at 9:21 AM

## 2025-03-21 ENCOUNTER — APPOINTMENT (OUTPATIENT)
Dept: CT IMAGING | Age: 65
DRG: 422 | End: 2025-03-21
Attending: FAMILY MEDICINE
Payer: MEDICAID

## 2025-03-21 VITALS
HEART RATE: 64 BPM | TEMPERATURE: 97.9 F | OXYGEN SATURATION: 96 % | RESPIRATION RATE: 16 BRPM | BODY MASS INDEX: 26.4 KG/M2 | SYSTOLIC BLOOD PRESSURE: 140 MMHG | DIASTOLIC BLOOD PRESSURE: 85 MMHG | HEIGHT: 74 IN | WEIGHT: 205.69 LBS

## 2025-03-21 LAB
ANION GAP SERPL CALCULATED.3IONS-SCNC: 9 MMOL/L (ref 3–16)
BUN SERPL-MCNC: 4 MG/DL (ref 7–20)
CALCIUM SERPL-MCNC: 8.6 MG/DL (ref 8.3–10.6)
CHLORIDE SERPL-SCNC: 100 MMOL/L (ref 99–110)
CO2 SERPL-SCNC: 26 MMOL/L (ref 21–32)
CREAT SERPL-MCNC: 0.7 MG/DL (ref 0.8–1.3)
GFR SERPLBLD CREATININE-BSD FMLA CKD-EPI: >90 ML/MIN/{1.73_M2}
GLUCOSE SERPL-MCNC: 88 MG/DL (ref 70–99)
POTASSIUM SERPL-SCNC: 4.4 MMOL/L (ref 3.5–5.1)
SODIUM SERPL-SCNC: 135 MMOL/L (ref 136–145)

## 2025-03-21 PROCEDURE — 2500000003 HC RX 250 WO HCPCS: Performed by: INTERNAL MEDICINE

## 2025-03-21 PROCEDURE — 80048 BASIC METABOLIC PNL TOTAL CA: CPT

## 2025-03-21 PROCEDURE — 71260 CT THORAX DX C+: CPT

## 2025-03-21 PROCEDURE — 6370000000 HC RX 637 (ALT 250 FOR IP): Performed by: STUDENT IN AN ORGANIZED HEALTH CARE EDUCATION/TRAINING PROGRAM

## 2025-03-21 PROCEDURE — 6370000000 HC RX 637 (ALT 250 FOR IP): Performed by: INTERNAL MEDICINE

## 2025-03-21 PROCEDURE — 6370000000 HC RX 637 (ALT 250 FOR IP): Performed by: FAMILY MEDICINE

## 2025-03-21 PROCEDURE — 6370000000 HC RX 637 (ALT 250 FOR IP)

## 2025-03-21 PROCEDURE — 6360000004 HC RX CONTRAST MEDICATION

## 2025-03-21 PROCEDURE — 36415 COLL VENOUS BLD VENIPUNCTURE: CPT

## 2025-03-21 PROCEDURE — 6360000002 HC RX W HCPCS: Performed by: INTERNAL MEDICINE

## 2025-03-21 RX ORDER — CLINDAMYCIN HYDROCHLORIDE 300 MG/1
300 CAPSULE ORAL 3 TIMES DAILY
Qty: 5 CAPSULE | Refills: 0
Start: 2025-03-21 | End: 2025-03-28

## 2025-03-21 RX ORDER — IOPAMIDOL 755 MG/ML
75 INJECTION, SOLUTION INTRAVASCULAR
Status: COMPLETED | OUTPATIENT
Start: 2025-03-21 | End: 2025-03-21

## 2025-03-21 RX ADMIN — SODIUM CHLORIDE, PRESERVATIVE FREE 10 ML: 5 INJECTION INTRAVENOUS at 10:13

## 2025-03-21 RX ADMIN — ENOXAPARIN SODIUM 40 MG: 100 INJECTION SUBCUTANEOUS at 10:12

## 2025-03-21 RX ADMIN — IOPAMIDOL 75 ML: 755 INJECTION, SOLUTION INTRAVENOUS at 11:51

## 2025-03-21 RX ADMIN — CETIRIZINE HYDROCHLORIDE 10 MG: 10 TABLET, FILM COATED ORAL at 10:12

## 2025-03-21 RX ADMIN — Medication 1 LOZENGE: at 05:32

## 2025-03-21 RX ADMIN — DIVALPROEX SODIUM 1000 MG: 250 TABLET, DELAYED RELEASE ORAL at 10:11

## 2025-03-21 RX ADMIN — Medication 1 LOZENGE: at 03:34

## 2025-03-21 RX ADMIN — CLINDAMYCIN PHOSPHATE 300 MG: 300 INJECTION, SOLUTION INTRAVENOUS at 14:54

## 2025-03-21 RX ADMIN — CLINDAMYCIN PHOSPHATE 300 MG: 300 INJECTION, SOLUTION INTRAVENOUS at 05:23

## 2025-03-21 RX ADMIN — TRAMADOL HYDROCHLORIDE 50 MG: 50 TABLET, COATED ORAL at 05:23

## 2025-03-21 RX ADMIN — Medication 1 TABLET: at 10:11

## 2025-03-21 RX ADMIN — SENNOSIDES AND DOCUSATE SODIUM 1 TABLET: 50; 8.6 TABLET ORAL at 10:12

## 2025-03-21 NOTE — PROGRESS NOTES
V2.0    [facility] Progress Note      Name:  Arturo Sweeney /Age/Sex: 1960  (65 y.o. male)   MRN & CSN:  [id number] & [id number] Encounter Date/Time: 3/21/2025 11:11 AM EDT   Location:  [id number]-[id number]/4114-01 PCP: No primary care provider on file.     Attending:Naya Sousa MD       Hospital Day: 4    Assessment and Recommendations   Arturo Sweeney is a 65 y.o. male with pmh of HTN, COPD, mood disorder who presents with Syncope and collapse    ENT evaluated the patient.  Recommended patient to get a CT chest which will be obtained this morning.  ENT was able to obtain a biopsy from patient's mouth ulcer     May be able to discharge patient to SNF after CT chest later today or early tomorrow morning      Plan:   Syncope and collapse  Most likely in setting of orthostatic hypotension      2.  Mouth ulcer  Unable to fully open mouth.  Might be due to the setting of chronic tobacco abuse  CT obtained to show hypodensity versus ulceration with a mildly enhancing ring approximately 2 x 2 x 1.1 cm  Patient started on clindamycin 300 mg 3 times a day  ENT consulted for their recommendations      3.  Mood disorder  Depakote 1 g twice daily  Anafranil 25 mg nightly    Will continue to follow, monitor and manage chronic medical condition with medication listed below  Diet ADULT DIET; Regular; Low Fat/Low Chol/High Fiber/2 gm Na   DVT Prophylaxis [] Lovenox, [x]  Heparin, [] SCDs, [x] Ambulation,  [] Eliquis, [] Xarelto  [] Coumadin   Code Status Full Code   Disposition From: SNF  Expected Disposition: SNF  Estimated Date of Discharge: 1-2 days  Patient requires continued admission due to further evaluation   Surrogate Decision Maker/ Rhianna Chicas     Personally reviewed Lab Studies and Imaging         Medical Decision Making:  The following items were considered in medical decision making:  Discussion of patient care with other providers  Reviewed clinical lab tests  Reviewed radiology tests  Reviewed other diagnostic  101 102   CO2 24 25   BUN 8 9   CREATININE 1.1 0.7*   GLUCOSE 124* 98     Hepatic:   Recent Labs     03/18/25  1656   AST 23   ALT 12   BILITOT 0.4   ALKPHOS 53     Lipids:   Lab Results   Component Value Date/Time    CHOL 242 02/07/2017 09:33 AM     02/07/2017 09:33 AM    TRIG 77 02/07/2017 09:33 AM     Hemoglobin A1C: No results found for: \"LABA1C\"  TSH: No results found for: \"TSH\"  Troponin: No results found for: \"TROPONINT\"  Lactic Acid: No results for input(s): \"LACTA\" in the last 72 hours.  BNP: No results for input(s): \"PROBNP\" in the last 72 hours.  UA:  Lab Results   Component Value Date/Time    NITRU Negative 03/19/2025 06:21 PM    COLORU DARK YELLOW 03/19/2025 06:21 PM    PHUR 6.0 03/19/2025 06:21 PM    PHUR 6.5 06/21/2020 11:27 AM    PHUR 6.5 06/21/2020 11:27 AM    CLARITYU Clear 03/19/2025 06:21 PM    LEUKOCYTESUR Negative 03/19/2025 06:21 PM    UROBILINOGEN 4.0 03/19/2025 06:21 PM    BILIRUBINUR SMALL 03/19/2025 06:21 PM    BLOODU Negative 03/19/2025 06:21 PM    GLUCOSEU Negative 03/19/2025 06:21 PM    KETUA TRACE 03/19/2025 06:21 PM     Urine Cultures: No results found for: \"LABURIN\"  Blood Cultures:   Lab Results   Component Value Date/Time    BC  03/19/2025 08:05 AM     No Growth to date.  Any change in status will be called.     Lab Results   Component Value Date/Time    BLOODCULT2  03/19/2025 08:09 AM     No Growth to date.  Any change in status will be called.     Organism: No results found for: \"ORG\"      Electronically signed by Naya Sousa MD on 3/21/2025 at 9:13 AM  Comment: Please note this report has been produced using speech recognition software and may contain errors related to that system including errors in grammar, punctuation, and spelling, as well as words and phrases that may be inappropriate. If there are any questions or concerns, please feel free to contact the dictating provider for clarification.

## 2025-03-21 NOTE — DISCHARGE INSTR - COC
Continuity of Care Form    Patient Name: Arturo Sweeney   :  1960  MRN:  [id number]    Admit date:  3/18/2025  Discharge date:  ***    Code Status Order: Full Code   Advance Directives:     Admitting Physician:  Solomon Calvin MD  PCP: No primary care provider on file.    Discharging Nurse: ***  Discharging Hospital Unit/Room#: Y1Q-1957/4114-01  Discharging Unit Phone Number: ***    Emergency Contact:   Extended Emergency Contact Information  Primary Emergency Contact: Vic Sweeney  Home Phone: [phone]  Relation: Brother/Sister  Secondary Emergency Contact: Rhianna vizcarra \"ankita\"  Mobile Phone: [phone]  Relation: Brother/Sister  Preferred language: English   needed? No    Past Surgical History:  Past Surgical History:   Procedure Laterality Date    KNEE SURGERY Left     Fracture:ORIF       Immunization History:   Immunization History   Administered Date(s) Administered    COVID-19, MODERNA BLUE border, Primary or Immunocompromised, (age 12y+), IM, 100 mcg/0.5mL 2022    COVID-19, PFIZER PURPLE top, DILUTE for use, (age 12 y+), 30mcg/0.3mL 2021, 2021    TDaP, ADACEL (age 10y-64y), BOOSTRIX (age 10y+), IM, 0.5mL 2020       Active Problems:  Patient Active Problem List   Diagnosis Code    Neck pain on left side M54.2    Left-sided low back pain   M54.50    Anxiety F41.9    Elevated blood pressure reading without diagnosis of hypertension R03.0    ETOH abuse F10.10    Hyperlipidemia E78.5    Elevated PSA R97.20    Elevated liver function tests R79.89    Syncope and collapse R55       Isolation/Infection:   Isolation            No Isolation          Patient Infection Status    None to display              Nurse Assessment:  Last Vital Signs: /78   Pulse 57   Temp 97.3 °F (36.3 °C) (Oral)   Resp 16   Ht 1.88 m (6' 2\")   Wt 93.3 kg (205 lb 11 oz)   SpO2 98%   BMI 26.41 kg/m²     Last documented pain score (0-10 scale): Pain Level: 5  Last Weight:   Wt

## 2025-03-21 NOTE — PROGRESS NOTES
Report called to  Lisa, receiving nurse at [facility]. Transportation anticipated at 1600. Pt and legal guardian aware of transfer.

## 2025-03-21 NOTE — CARE COORDINATION
Case Management Discharge Note          Date / Time of Note: 3/21/2025 2:23 PM                  Patient Name: Arturo Sweeney   YOB: 1960  Diagnosis: Syncope and collapse [R55]  Oral ulcer [K12.1]  Dental infection [K04.7]   Date / Time: 3/18/2025  4:41 PM    Financial:  Payor: [place]ICA[place] / Plan: [place] / Product Type: *No Product type* /      Pharmacy:    [facility] [id number] [place], [place] - [address] [phone] - F [phone]  [address] [facility] [address]  Phone: [phone] Fax: [phone]      Assistance purchasing medications?: Potential Assistance Purchasing Medications: No  Assistance provided by Case Management: None at this time    DISCHARGE Disposition: Long Term Care Facility (LTC)    Nursing Facility:   Discharging to Facility/ Agency   Name: [facility]  Address:  [address] [place] [address]   Phone:  [phone]  Fax:  [phone]      LOC at discharge: Long Term Care  BUCK Completed: Yes             NURSING REPORT NUMBER: [phone]               NURSING FAX NUMBER: [phone]    Notification completed in HENS/PAS?:  Not Applicable      Transportation:  Transportation PLAN for discharge: EMS transportation   Mode of Transport: Ambulance stretcher - BLS  Reason for medical transport: Other: Confused, requires supervision  Name of Transport Company: [facility]  Phone: [phone]  Time of Transport: 1600    Transport form completed: Yes    IMM Completed:   Not Indicated    Additional CM Notes: Return to [facility] via [facility].  SW notified pts legal guardian, Rhianna.    SW notified Kevin at [facility].    The Plan for Transition of Care is related to the following treatment goals of Syncope and collapse [R55]  Oral ulcer [K12.1]  Dental infection [K04.7]    The Patient and/or patient representative Arturo and his family were provided with a choice of provider and agrees with the

## 2025-03-21 NOTE — PROGRESS NOTES
Patient A&Ox2, disoriented to place and situation. VSS . Pt denies pain or nausea, tolerating diet well. Pt impulsive at time, fall precautions and AVAsys on for safety. Pt anticipating to return to SNF at discharge. Pt instructed to call out for needs. Will continue to monitor for changes.

## 2025-03-21 NOTE — PLAN OF CARE
Problem: Discharge Planning  Goal: Discharge to home or other facility with appropriate resources  Outcome: Progressing     Problem: Safety - Adult  Goal: Free from fall injury  Outcome: Progressing     Problem: Neurosensory - Adult  Goal: Achieves stable or improved neurological status  Outcome: Progressing  Goal: Achieves maximal functionality and self care  Outcome: Progressing     Problem: Respiratory - Adult  Goal: Achieves optimal ventilation and oxygenation  Outcome: Progressing     Problem: Cardiovascular - Adult  Goal: Maintains optimal cardiac output and hemodynamic stability  Outcome: Progressing  Goal: Absence of cardiac dysrhythmias or at baseline  Outcome: Progressing     Problem: Skin/Tissue Integrity - Adult  Goal: Skin integrity remains intact  Description: 1.  Monitor for areas of redness and/or skin breakdown  2.  Assess vascular access sites hourly  3.  Every 4-6 hours minimum:  Change oxygen saturation probe site  4.  Every 4-6 hours:  If on nasal continuous positive airway pressure, respiratory therapy assess nares and determine need for appliance change or resting period  Outcome: Progressing  Flowsheets (Taken 3/20/2025 5745)  Skin Integrity Remains Intact:   Monitor for areas of redness and/or skin breakdown   Assess vascular access sites hourly  Goal: Incisions, wounds, or drain sites healing without S/S of infection  Outcome: Progressing  Goal: Oral mucous membranes remain intact  Outcome: Progressing     Problem: Musculoskeletal - Adult  Goal: Return mobility to safest level of function  Outcome: Progressing  Goal: Maintain proper alignment of affected body part  Outcome: Progressing  Goal: Return ADL status to a safe level of function  Outcome: Progressing     Problem: Gastrointestinal - Adult  Goal: Minimal or absence of nausea and vomiting  Outcome: Progressing  Goal: Maintains or returns to baseline bowel function  Outcome: Progressing  Goal: Maintains adequate nutritional

## 2025-03-21 NOTE — PLAN OF CARE
Problem: Confusion  Goal: Confusion, delirium, dementia, or psychosis is improved or at baseline  Description: INTERVENTIONS:  1. Assess for possible contributors to thought disturbance, including medications, impaired vision or hearing, underlying metabolic abnormalities, dehydration, psychiatric diagnoses, and notify attending LIP  2. [place] high risk fall precautions, as indicated  3. Provide frequent short contacts to provide reality reorientation, refocusing and direction  4. Decrease environmental stimuli, including noise as appropriate  5. Monitor and intervene to maintain adequate nutrition, hydration, elimination, sleep and activity  6. If unable to ensure safety without constant attention obtain sitter and review sitter guidelines with assigned personnel  7. Initiate Psychosocial CNS and Spiritual Care consult, as indicated  3/21/2025 1306 by Suly Rolon RN  Outcome: Not Progressing    Problem: Discharge Planning  Goal: Discharge to home or other facility with appropriate resources  3/21/2025 1306 by Suly Rolon, RN  Outcome: Progressing     Problem: Safety - Adult  Goal: Free from fall injury  3/21/2025 1306 by Suly Rolon, RN  Outcome: Progressing

## 2025-03-23 LAB
BACTERIA BLD CULT ORG #2: NORMAL
BACTERIA BLD CULT: NORMAL

## 2025-03-23 NOTE — DISCHARGE SUMMARY
Hospital Medicine Discharge Summary    Patient ID: Arturo Sweeney      Patient's PCP: No primary care provider on file.    Admit Date: 3/18/2025     Discharge Date: 3/21/2025      Admitting Provider: Solomon Calvin MD     Discharge Provider: Naya Sousa MD     Discharge Diagnoses:       Active Hospital Problems    Diagnosis     Syncope and collapse [R55]        The patient was seen and examined on day of discharge and this discharge summary is in conjunction with any daily progress note from day of discharge.    Hospital Course:   Arturo Sweeney is a 65 y.o. male with pmh of HTN, COPD, mood disorder who presents with Syncope and collapse     Patient was examined and was found to have orthostatic hypotension which improved with IV fluid hydration.  Patient was also found to have a mouth ulcer.  Does have a history of chronic tobacco abuse.  CT scan of the face showed hypodensity versus ulceration with mildly enhancing ring approximately 2 x 2 x 1.1 cm.  ENT was consulted and they obtained a biopsy and to follow-up outpatient with them regarding the biopsy.  Patient was discharged to his skilled nursing facility.  Patient is to follow-up with ENT outpatient  Patient is follow-up with PCP in 3 to 5 days or sooner if needed    Plan:   Syncope and collapse  Most likely in setting of orthostatic hypotension        2.  Mouth ulcer  Unable to fully open mouth.  Might be due to the setting of chronic tobacco abuse  CT obtained to show hypodensity versus ulceration with a mildly enhancing ring approximately 2 x 2 x 1.1 cm  Patient started on clindamycin 300 mg 3 times a day  ENT consulted for their recommendations        3.  Mood disorder  Depakote 1 g twice daily  Anafranil 25 mg nightly     Will continue to follow, monitor and manage chronic medical condition with medication listed below          Physical Exam Performed:     BP (!) 140/85   Pulse 64   Temp 97.9 °F (36.6 °C) (Oral)   Resp 16   Ht 1.88 m

## 2025-03-24 NOTE — PROGRESS NOTES
Physician Progress Note      PATIENT:               MARCELLA AGARWAL  CSN #:                  [id number]  :                       1960  ADMIT DATE:       3/18/2025 4:41 PM  DISCH DATE:        3/21/2025 3:55 PM  RESPONDING  PROVIDER #:        Naya Sousa MD          QUERY TEXT:    Patient admitted with syncope. noted to have orthostatic hypotension. If   possible, please document in progress notes and discharge summary after study   the etiology of the syncope:    The medical record reflects the following:  Risk Factors: 65 y.o male with pMHx of CAD  Clinical Indicators: 3/18 - IM H+P - Syncope: Orthostatic vitals ordered. 3/19   - IM - Syncope/Orthostatic hypotension: Significant BP drop with   orthostatics. IV fluid resuscitation today and recheck orthostatic vitals.   3/21 - IM DC - atient was examined and was found to have orthostatic   hypotension which improved with IV fluid hydration.  Treatment: IVF, imaging, EKG, telemetry, ECHO  Options provided:  -- The syncope is due to orthostatic hypotension secondary to dehydration.  -- Other - I will add my own diagnosis  -- Disagree - Not applicable / Not valid  -- Disagree - Clinically unable to determine / Unknown  -- Refer to Clinical Documentation Reviewer    PROVIDER RESPONSE TEXT:    The syncope is due to orthostatic hypotension secondary to dehydration    Query created by: Ted Burris on 3/24/2025 10:21 AM      Electronically signed by:  Naya Sousa MD 3/24/2025 10:51 AM

## 2025-03-25 ENCOUNTER — TELEPHONE (OUTPATIENT)
Dept: ENT CLINIC | Age: 65
End: 2025-03-25

## 2025-03-25 NOTE — TELEPHONE ENCOUNTER
Pt was seen in hospital by fam and a biopsy was done by fam and sister Rhianna is calling to get results; please call

## 2025-04-01 ENCOUNTER — OFFICE VISIT (OUTPATIENT)
Dept: ENT CLINIC | Age: 65
End: 2025-04-01

## 2025-04-01 DIAGNOSIS — C06.9 CANCER OF ORAL CAVITY (HCC): ICD-10-CM

## 2025-04-01 DIAGNOSIS — G89.3 CANCER ASSOCIATED PAIN: Primary | ICD-10-CM

## 2025-04-01 RX ORDER — ACETAMINOPHEN AND CODEINE PHOSPHATE 300; 15 MG/1; MG/1
1 TABLET ORAL EVERY 4 HOURS PRN
Qty: 28 TABLET | Refills: 0 | Status: SHIPPED | OUTPATIENT
Start: 2025-04-01 | End: 2025-04-08

## 2025-04-01 NOTE — PROGRESS NOTES
[facility]  DIVISION OF OTOLARYNGOLOGY- HEAD & NECK SURGERY  CONSULT      Arturo Sweeney (:  1960) is a 65 y.o. male, here for evaluation of the following chief complaint(s):  Other (Discuss results)      ASSESSMENT/PLAN:  1. Cancer associated pain  -     acetaminophen-Codeine (TYLENOL #2) 300-15 MG per tablet; Take 1 tablet by mouth every 4 hours as needed for Pain for up to 7 days. Max Daily Amount: 6 tablets, Disp-28 tablet, R-0Print  2. Cancer of oral cavity (HCC)  -     PET CT SKULL BASE TO MID THIGH; Future         This is a very pleasant 65 y.o. male here today for evaluation of the the above-noted complaints.      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  I suspect that this is related to his smoking and drinking history.  We discussed treatment options in detail including chemoradiation versus surgical resection.      1. Advanced stage oral cavity cancer  This conversation was conducted with the patient's sisters/healthcare power of  and guardian to inform decision making process and was done without the patient present.    The patient has an advanced stage oral cavity cancer.  We had a lengthy discussion regarding treatment options.  We discussed that the gold standard would be surgical resection followed by radiation or chemoradiation.  We discussed it would be very difficult for him to get through this.    At present, he has no concern for acute airway obstruction.  We discussed that if left untreated, it may progress to him needing a tracheostomy and or PEG tube.    We discussed other potential treatment options including chemoradiation, which there is some question as to whether or not he would be able to tolerate this.  We also discussed palliative chemo and/or radiation.    Finally, we discussed palliative therapy and hospice.  The sisters would like to meet with our hematology oncology and radiation

## 2025-05-05 ENCOUNTER — OFFICE VISIT (OUTPATIENT)
Dept: ENT CLINIC | Age: 65
End: 2025-05-05
Payer: MEDICAID

## 2025-05-05 VITALS
BODY MASS INDEX: 23.74 KG/M2 | HEART RATE: 55 BPM | DIASTOLIC BLOOD PRESSURE: 68 MMHG | OXYGEN SATURATION: 97 % | SYSTOLIC BLOOD PRESSURE: 103 MMHG | WEIGHT: 185 LBS | HEIGHT: 74 IN

## 2025-05-05 DIAGNOSIS — H61.23 BILATERAL IMPACTED CERUMEN: ICD-10-CM

## 2025-05-05 DIAGNOSIS — G89.3 CANCER ASSOCIATED PAIN: ICD-10-CM

## 2025-05-05 DIAGNOSIS — H60.333 ACUTE SWIMMER'S EAR OF BOTH SIDES: Primary | ICD-10-CM

## 2025-05-05 DIAGNOSIS — C06.9 CANCER OF ORAL CAVITY (HCC): ICD-10-CM

## 2025-05-05 PROCEDURE — 69210 REMOVE IMPACTED EAR WAX UNI: CPT | Performed by: NURSE PRACTITIONER

## 2025-05-05 PROCEDURE — 99203 OFFICE O/P NEW LOW 30 MIN: CPT | Performed by: NURSE PRACTITIONER

## 2025-05-05 RX ORDER — OFLOXACIN 3 MG/ML
5 SOLUTION AURICULAR (OTIC) 2 TIMES DAILY
Qty: 5 ML | Refills: 0 | Status: SHIPPED | OUTPATIENT
Start: 2025-05-05 | End: 2025-05-15

## 2025-05-05 NOTE — PROGRESS NOTES
[facility]  DIVISION OF OTOLARYNGOLOGY- HEAD & NECK SURGERY  CLINIC FOLLOW-UP VISIT      Patient Name: Arturo Sweeney  Medical Record Number:  [id number]  Primary Care Physician:  No primary care provider on file.    ChiefComplaint     Chief Complaint   Patient presents with    Cerumen Impaction     Bilateral ear cleaning        History of Present Illness     Arturo Sweeney is an 65 y.o. 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    Interval History:   Arturo presents today for bilateral cerumen impactions and ear cleaning.  Since his last visit, he enrolled in hospice care at the [facility] after discussion of his treatment plan with Dr. Hood.  He is having more right trismus, and more difficulty swallowing.  Denies trouble breathing.  He can really only tolerate soft foods that family is bringing in.  Hospice and oncology are managing his pain regimen.  His sister is worried about worsening sore throat on the right side.    Past Medical History     Past Medical History:   Diagnosis Date    ETOH abuse 08/25/2015    Hyperlipidemia 10/19/2015    Left-sided low back pain   08/25/2015    Mood disorder     Normal delivery     Schizophrenia (HCC)        Past Surgical History     Past Surgical History:   Procedure Laterality Date    KNEE SURGERY Left 1976    Fracture:ORIF       Family History     Family History   Problem Relation Age of Onset    Cancer Father 65        Colon    Rheum Arthritis Neg Hx     Osteoarthritis Neg Hx     Asthma Neg Hx     Diabetes Neg Hx     Heart Failure Neg Hx     High Cholesterol Neg Hx     Hypertension Neg Hx     Migraines Neg Hx     Rashes/Skin Problems Neg Hx     Seizures Neg Hx     Stroke Neg Hx     Thyroid Disease Neg Hx        Social History     Social History     Tobacco Use    Smoking status: Every Day     Current packs/day: 1.00     Average packs/day: 1 pack/day for 20.0 years (20.0